# Patient Record
Sex: FEMALE | Race: WHITE | Employment: UNEMPLOYED | URBAN - METROPOLITAN AREA
[De-identification: names, ages, dates, MRNs, and addresses within clinical notes are randomized per-mention and may not be internally consistent; named-entity substitution may affect disease eponyms.]

---

## 2017-10-13 ENCOUNTER — HOSPITAL ENCOUNTER (INPATIENT)
Age: 37
LOS: 10 days | Discharge: HOME | DRG: 750 | End: 2017-10-23
Attending: PSYCHIATRY & NEUROLOGY | Admitting: PSYCHIATRY & NEUROLOGY
Payer: COMMERCIAL

## 2017-10-13 DIAGNOSIS — F20.0 PARANOID SCHIZOPHRENIA (HCC): Primary | ICD-10-CM

## 2017-10-13 LAB
ALBUMIN SERPL-MCNC: 4.2 G/DL (ref 3.9–4.9)
ALP BLD-CCNC: 64 U/L (ref 40–130)
ALT SERPL-CCNC: 13 U/L (ref 0–33)
AMPHETAMINE SCREEN, URINE: POSITIVE
ANION GAP SERPL CALCULATED.3IONS-SCNC: 14 MEQ/L (ref 7–13)
AST SERPL-CCNC: 17 U/L (ref 0–35)
BACTERIA: NORMAL /HPF
BARBITURATE SCREEN URINE: ABNORMAL
BASOPHILS ABSOLUTE: 0 K/UL (ref 0–0.2)
BASOPHILS RELATIVE PERCENT: 0.6 %
BENZODIAZEPINE SCREEN, URINE: POSITIVE
BILIRUB SERPL-MCNC: 0.6 MG/DL (ref 0–1.2)
BILIRUBIN URINE: ABNORMAL
BLOOD, URINE: NEGATIVE
BUN BLDV-MCNC: 5 MG/DL (ref 6–20)
CALCIUM SERPL-MCNC: 9.5 MG/DL (ref 8.6–10.2)
CANNABINOID SCREEN URINE: ABNORMAL
CASTS 2: NORMAL /LPF
CASTS UA: NORMAL /LPF
CHLORIDE BLD-SCNC: 102 MEQ/L (ref 98–107)
CLARITY: ABNORMAL
CO2: 26 MEQ/L (ref 22–29)
COCAINE METABOLITE SCREEN URINE: ABNORMAL
COLOR: ABNORMAL
CREAT SERPL-MCNC: 0.42 MG/DL (ref 0.5–0.9)
CRYSTALS, UA: NORMAL
EOSINOPHILS ABSOLUTE: 0 K/UL (ref 0–0.7)
EOSINOPHILS RELATIVE PERCENT: 0.2 %
EPITHELIAL CELLS, UA: NORMAL /HPF
ETHANOL PERCENT: NORMAL G/DL
ETHANOL: <10 MG/DL (ref 0–0.08)
GFR AFRICAN AMERICAN: >60
GFR NON-AFRICAN AMERICAN: >60
GLOBULIN: 2.4 G/DL (ref 2.3–3.5)
GLUCOSE BLD-MCNC: 128 MG/DL (ref 74–109)
GLUCOSE URINE: NEGATIVE MG/DL
HCG(URINE) PREGNANCY TEST: NEGATIVE
HCT VFR BLD CALC: 38.5 % (ref 37–47)
HEMOGLOBIN: 13 G/DL (ref 12–16)
KETONES, URINE: 40 MG/DL
LEUKOCYTE ESTERASE, URINE: ABNORMAL
LYMPHOCYTES ABSOLUTE: 1.5 K/UL (ref 1–4.8)
LYMPHOCYTES RELATIVE PERCENT: 27.4 %
Lab: ABNORMAL
MCH RBC QN AUTO: 30 PG (ref 27–31.3)
MCHC RBC AUTO-ENTMCNC: 33.7 % (ref 33–37)
MCV RBC AUTO: 88.9 FL (ref 82–100)
MONOCYTES ABSOLUTE: 0.6 K/UL (ref 0.2–0.8)
MONOCYTES RELATIVE PERCENT: 11.7 %
MUCUS: PRESENT
NEUTROPHILS ABSOLUTE: 3.3 K/UL (ref 1.4–6.5)
NEUTROPHILS RELATIVE PERCENT: 60.1 %
NITRITE, URINE: NEGATIVE
OPIATE SCREEN URINE: ABNORMAL
PDW BLD-RTO: 12.3 % (ref 11.5–14.5)
PH UA: 6 (ref 5–9)
PHENCYCLIDINE SCREEN URINE: ABNORMAL
PLATELET # BLD: 252 K/UL (ref 130–400)
POTASSIUM SERPL-SCNC: 3.4 MEQ/L (ref 3.5–5.1)
PROTEIN UA: 30 MG/DL
RBC # BLD: 4.33 M/UL (ref 4.2–5.4)
RBC UA: NORMAL /HPF (ref 0–2)
SODIUM BLD-SCNC: 142 MEQ/L (ref 132–144)
SPECIFIC GRAVITY UA: 1.02 (ref 1–1.03)
TOTAL CK: 54 U/L (ref 0–170)
TOTAL PROTEIN: 6.6 G/DL (ref 6.4–8.1)
TSH SERPL DL<=0.05 MIU/L-ACNC: 1.12 UIU/ML (ref 0.27–4.2)
UROBILINOGEN, URINE: 1 E.U./DL
WBC # BLD: 5.4 K/UL (ref 4.8–10.8)
WBC UA: NORMAL /HPF (ref 0–5)

## 2017-10-13 PROCEDURE — 99285 EMERGENCY DEPT VISIT HI MDM: CPT

## 2017-10-13 PROCEDURE — 81001 URINALYSIS AUTO W/SCOPE: CPT

## 2017-10-13 PROCEDURE — 6370000000 HC RX 637 (ALT 250 FOR IP): Performed by: PHYSICIAN ASSISTANT

## 2017-10-13 PROCEDURE — 1240000000 HC EMOTIONAL WELLNESS R&B

## 2017-10-13 PROCEDURE — 36415 COLL VENOUS BLD VENIPUNCTURE: CPT

## 2017-10-13 PROCEDURE — G0480 DRUG TEST DEF 1-7 CLASSES: HCPCS

## 2017-10-13 PROCEDURE — 80307 DRUG TEST PRSMV CHEM ANLYZR: CPT

## 2017-10-13 PROCEDURE — 6370000000 HC RX 637 (ALT 250 FOR IP): Performed by: PSYCHIATRY & NEUROLOGY

## 2017-10-13 PROCEDURE — 82550 ASSAY OF CK (CPK): CPT

## 2017-10-13 PROCEDURE — 80053 COMPREHEN METABOLIC PANEL: CPT

## 2017-10-13 PROCEDURE — 84703 CHORIONIC GONADOTROPIN ASSAY: CPT

## 2017-10-13 PROCEDURE — 84443 ASSAY THYROID STIM HORMONE: CPT

## 2017-10-13 PROCEDURE — 85025 COMPLETE CBC W/AUTO DIFF WBC: CPT

## 2017-10-13 RX ORDER — HALOPERIDOL 5 MG/ML
5 INJECTION INTRAMUSCULAR EVERY 6 HOURS PRN
Status: DISCONTINUED | OUTPATIENT
Start: 2017-10-13 | End: 2017-10-23 | Stop reason: HOSPADM

## 2017-10-13 RX ORDER — DEXTROAMPHETAMINE SACCHARATE, AMPHETAMINE ASPARTATE, DEXTROAMPHETAMINE SULFATE AND AMPHETAMINE SULFATE 7.5; 7.5; 7.5; 7.5 MG/1; MG/1; MG/1; MG/1
30 TABLET ORAL DAILY
Status: ON HOLD | COMMUNITY
End: 2017-10-23 | Stop reason: HOSPADM

## 2017-10-13 RX ORDER — HYDROXYZINE PAMOATE 50 MG/1
50 CAPSULE ORAL EVERY 6 HOURS PRN
Status: DISCONTINUED | OUTPATIENT
Start: 2017-10-13 | End: 2017-10-23 | Stop reason: HOSPADM

## 2017-10-13 RX ORDER — HYDROXYZINE HYDROCHLORIDE 50 MG/ML
50 INJECTION, SOLUTION INTRAMUSCULAR EVERY 6 HOURS PRN
Status: DISCONTINUED | OUTPATIENT
Start: 2017-10-13 | End: 2017-10-23 | Stop reason: HOSPADM

## 2017-10-13 RX ORDER — HALOPERIDOL 5 MG
5 TABLET ORAL EVERY 6 HOURS PRN
Status: DISCONTINUED | OUTPATIENT
Start: 2017-10-13 | End: 2017-10-23 | Stop reason: HOSPADM

## 2017-10-13 RX ORDER — PALIPERIDONE 6 MG/1
6 TABLET, EXTENDED RELEASE ORAL EVERY MORNING
Status: ON HOLD | COMMUNITY
End: 2017-10-23 | Stop reason: HOSPADM

## 2017-10-13 RX ORDER — ZOLPIDEM TARTRATE 10 MG/1
TABLET ORAL NIGHTLY PRN
Status: ON HOLD | COMMUNITY
End: 2017-10-23 | Stop reason: HOSPADM

## 2017-10-13 RX ORDER — ACETAMINOPHEN 325 MG/1
650 TABLET ORAL EVERY 4 HOURS PRN
Status: DISCONTINUED | OUTPATIENT
Start: 2017-10-13 | End: 2017-10-23 | Stop reason: HOSPADM

## 2017-10-13 RX ORDER — CLONAZEPAM 1 MG/1
1 TABLET ORAL 3 TIMES DAILY
Status: ON HOLD | COMMUNITY
End: 2017-10-23 | Stop reason: HOSPADM

## 2017-10-13 RX ORDER — HALOPERIDOL 5 MG
5 TABLET ORAL ONCE
Status: COMPLETED | OUTPATIENT
Start: 2017-10-13 | End: 2017-10-13

## 2017-10-13 RX ORDER — PALIPERIDONE 6 MG/1
6 TABLET, EXTENDED RELEASE ORAL EVERY MORNING
Status: DISCONTINUED | OUTPATIENT
Start: 2017-10-14 | End: 2017-10-16

## 2017-10-13 RX ORDER — TRAZODONE HYDROCHLORIDE 50 MG/1
50 TABLET ORAL NIGHTLY PRN
Status: DISCONTINUED | OUTPATIENT
Start: 2017-10-13 | End: 2017-10-17

## 2017-10-13 RX ADMIN — HYDROXYZINE PAMOATE 50 MG: 50 CAPSULE ORAL at 20:46

## 2017-10-13 RX ADMIN — HALOPERIDOL 5 MG: 5 TABLET ORAL at 20:46

## 2017-10-13 RX ADMIN — TRAZODONE HYDROCHLORIDE 50 MG: 50 TABLET ORAL at 20:46

## 2017-10-13 RX ADMIN — HALOPERIDOL 5 MG: 5 TABLET ORAL at 13:59

## 2017-10-13 ASSESSMENT — ENCOUNTER SYMPTOMS
CONSTIPATION: 0
COLOR CHANGE: 0
SORE THROAT: 0
SHORTNESS OF BREATH: 0
EYE DISCHARGE: 0
ABDOMINAL DISTENTION: 0
ABDOMINAL PAIN: 0
RHINORRHEA: 0

## 2017-10-13 ASSESSMENT — SLEEP AND FATIGUE QUESTIONNAIRES
DIFFICULTY FALLING ASLEEP: YES
SLEEP PATTERN: DIFFICULTY FALLING ASLEEP;INSOMNIA;RESTLESSNESS
DIFFICULTY STAYING ASLEEP: YES
RESTFUL SLEEP: NO
DIFFICULTY ARISING: NO
DO YOU HAVE DIFFICULTY SLEEPING: YES
AVERAGE NUMBER OF SLEEP HOURS: 0
DO YOU USE A SLEEP AID: NO

## 2017-10-13 ASSESSMENT — PATIENT HEALTH QUESTIONNAIRE - PHQ9: SUM OF ALL RESPONSES TO PHQ QUESTIONS 1-9: 20

## 2017-10-13 NOTE — ED NOTES
Talked with Jose Erickson on [facility] who states she will have Claire Daniels call the ER back when she returns to the nursing desk area to give [employer] a report and get a bed.      Gadiel Roth RN  10/13/17 1217

## 2017-10-13 NOTE — ED NOTES
Pt and her boyfriend are aware of awaiting labs and will call the on call psychiatrist for pt's disposition.   Explained procedure and 3 possible disposition: D/C home, hospitalize, and or [facility]'s unit but have to be voluntary to go to [facility]      Slick Montague, RN  10/13/17 0127

## 2017-10-13 NOTE — ED NOTES
Lab here to draw her blood she is having trouble giving her name and date of birth to lab staff - it takes her awhile to respond and answer questions.   She is cooperative with the lab staff     Isadora Jerry RN  10/13/17 1677

## 2017-10-13 NOTE — ED NOTES
Explained labs and will be consulting with the doctor regarding labs for a UTI manuela Torres RN  10/13/17 6211

## 2017-10-13 NOTE — ED NOTES
PA and Dr. Nancy Harkins are busy with medical pt's currently will wait until the PA is back to ask regarding a UTI     Jessica Garay RN  10/13/17 0375

## 2017-10-13 NOTE — ED NOTES
Pt eating breakfast at the bedside, she is quiet and cooperative with no problems or C/O any kind noted or expressed.   She is at bedside, quiet and cooperative with even respirations noted     Tequila Wiggins RN  10/13/17 9668

## 2017-10-13 NOTE — ED NOTES
Pt is in the bedroom area with her boyfriend she is currently quiet and cooperative with no problems or C/O any kind noted or expressed.   No respiratory distress or SOB noted  Will call her pharmacy for her medications as pt is not aware of what she is taking Awaiting labs back and will call the on call doctor for her disposition     Florin Cordova RN  10/13/17 7666

## 2017-10-13 NOTE — ED NOTES
Pt came in with a friend from home as a walk in     [facility], [address] [place] [address]  10/13/17 7217

## 2017-10-13 NOTE — ED NOTES
Called security for pt's belongings sent bed request to the [facility] for room [address] [place] [address]  10/13/17 6208

## 2017-10-13 NOTE — ED NOTES
Pt is aware of waiting for her labs to come back before calling the on call doctor     Meir Singletary RN  10/13/17 1362

## 2017-10-13 NOTE — ED NOTES
Awaiting pt's microscopic from lab to return to see id she needs an ATB for a UTI awaiting on lab. Pt in bed area a little less fearful and has tried several times to exit the ced but accepts redirection from the door and has not attempted in last 10 minutes, she has even respirations noted no problems or C/O any kind noted or expressed. She will walk to the door and then return back to her bed area without pushing on the door.      Florin Cordova RN  10/13/17 3229

## 2017-10-13 NOTE — ED PROVIDER NOTES
[facility]  eMERGENCY dEPARTMENT eNCOUnter      Pt Name: Pérez Xiao  MRN: [id number]  Eliecergfperez 1980  Date of evaluation: 10/13/2017  Provider: Nik Day PA-C    CHIEF COMPLAINT       Chief Complaint   Patient presents with   [address] Problem      brings in for \"psychotic break\" symptoms have been getting progressively worse for about 1 week known \"schizophrenia\"         HISTORY OF PRESENT ILLNESS   (Location/Symptom, Timing/Onset, Context/Setting, Quality, Duration, Modifying Factors, Severity)  Note limiting factors. Pérez Xiao is a 40 y.o. female who presents to the emergency department With a complaint of increasing hallucinations and worsening paranoid schizophrenia.  states that over the last week or so patient has gotten to point to where she cannot carry on a conversation with him she is progressively became more paranoid and aggressive in her behavior, he states this is happened once previously in the past when her schizophrenia became worse. He states she has a private psychiatrist that she sees as far as he knows she's been taking her medications on a regular basis she's had no acute illness or injury. HPI    Nursing Notes were reviewed. REVIEW OF SYSTEMS    (2-9 systems for level 4, 10 or more for level 5)     Review of Systems   Constitutional: Negative for activity change, appetite change, fatigue and fever. HENT: Negative for congestion, ear discharge, ear pain, nosebleeds, rhinorrhea and sore throat. Eyes: Negative for discharge. Respiratory: Negative for shortness of breath. Cardiovascular: Negative for chest pain, palpitations and leg swelling. Gastrointestinal: Negative for abdominal distention, abdominal pain and constipation. Genitourinary: Negative for difficulty urinating and dysuria. Musculoskeletal: Negative for arthralgias. Skin: Negative for color change.    Neurological: Negative for dizziness, syncope, numbness and headaches. Psychiatric/Behavioral: Positive for confusion and hallucinations. Negative for agitation, self-injury, sleep disturbance and suicidal ideas. The patient is nervous/anxious. The patient is not hyperactive. Except as noted above the remainder of the review of systems was reviewed and negative. PAST MEDICAL HISTORY     Past Medical History:   Diagnosis Date    Depression     Headache          SURGICAL HISTORY       Past Surgical History:   Procedure Laterality Date     SECTION Bilateral          CURRENT MEDICATIONS       Current Discharge Medication List      CONTINUE these medications which have NOT CHANGED    Details   clonazePAM (KLONOPIN) 1 MG tablet Take 1 mg by mouth three times daily      paliperidone (INVEGA) 6 MG extended release tablet Take 6 mg by mouth every morning      zolpidem (AMBIEN) 10 MG tablet Take by mouth nightly as needed for Sleep      amphetamine-dextroamphetamine (ADDERALL) 30 MG tablet Take 30 mg by mouth daily . ALLERGIES     Geodon [ziprasidone hcl]; Imitrex [sumatriptan]; Nickel; and Zyprexa [olanzapine]    FAMILY HISTORY     No family history on file.        SOCIAL HISTORY       Social History     Social History    Marital status: Single     Spouse name: N/A    Number of children: N/A    Years of education: N/A     Social History Main Topics    Smoking status: Former Smoker    Smokeless tobacco: Not on file    Alcohol use No    Drug use: No    Sexual activity: Yes     Other Topics Concern    Not on file     Social History Narrative    No narrative on file       SCREENINGS   NIH Stroke Scale  NIH Stroke Scale Assessed: NoGlasgow Coma Scale  Eye Opening: Spontaneous  Best Verbal Response: Confused  Best Motor Response: Obeys commands  Toñito Coma Scale Score: 14        PHYSICAL EXAM    (up to 7 for level 4, 8 or more for level 5)     ED Triage Vitals [10/13/17 1022]   BP Temp Temp Source Pulse Resp SpO2 Height Weight   129/85 99 °F (37.2 °C) Oral 113 16 96 % 5' 7\" (1.702 m) 120 lb (54.4 kg)       Physical Exam   Constitutional: She appears well-developed and well-nourished. HENT:   Head: Normocephalic. Eyes: Pupils are equal, round, and reactive to light. Neck: Normal range of motion. Neck supple. No JVD present. No tracheal deviation present. Cardiovascular: Normal rate. Pulmonary/Chest: Effort normal. No respiratory distress. She has no wheezes. She has no rales. She exhibits no tenderness. Abdominal: Soft. Bowel sounds are normal. She exhibits no distension and no mass. There is no tenderness. There is no guarding. Musculoskeletal: She exhibits no edema or deformity. Neurological: She is alert. Coordination normal.   Patient is alert but she does seem confusion cannot answer simple questions she believes that everyone is talking about her, she is very paranoid in her behavior.  states she's not been sleeping over the last 48 hours. Skin: Skin is warm and dry. Psychiatric:   Paranoid behavior increasing auditory hallucinations per  she voices all the time. Her voice is becoming louder and more disruptive to her cognitive thoughts.        DIAGNOSTIC RESULTS     EKG: All EKG's are interpreted by the Emergency Department Physician who either signs or Co-signs this chart in the absence of a cardiologist.        RADIOLOGY:   Non-plain film images such as CT, Ultrasound and MRI are read by the radiologist. Plain radiographic images are visualized and preliminarily interpreted by the emergency physician with the below findings:        Interpretation per the Radiologist below, if available at the time of this note:    No orders to display         ED BEDSIDE ULTRASOUND:   Performed by ED Physician - none    LABS:  Labs Reviewed   COMPREHENSIVE METABOLIC PANEL - Abnormal; Notable for the following:        Result Value    Potassium 3.4 (*)     Anion Gap 14 (*)     Glucose 128 (*)     BUN 5 (*)     CREATININE 0.42 dictations but occasionally words are mis-transcribed.)    Callie Larry PA-C (electronically signed)  Attending Emergency Physician         Callie Larry PA-C  10/13/17 [address] [facility] Brianna Henry PA-C  10/13/17 8410

## 2017-10-13 NOTE — ED NOTES
Pt asked for medication to help her Klonopin or Haldol discussed with Hannah Leiva in zone I who ordered Haldol for the pt     Gadiel Roth RN  10/13/17 1875

## 2017-10-13 NOTE — PROGRESS NOTES
Pt experiencing auditory hallucinations AEB her coming to NS stating \"I can hear my baby crying, the door is locked and I can't get to her. Patient is wandering unit, difficult to re-direct.

## 2017-10-14 PROCEDURE — 1240000000 HC EMOTIONAL WELLNESS R&B

## 2017-10-14 PROCEDURE — 6370000000 HC RX 637 (ALT 250 FOR IP): Performed by: PSYCHIATRY & NEUROLOGY

## 2017-10-14 PROCEDURE — 6370000000 HC RX 637 (ALT 250 FOR IP): Performed by: NURSE PRACTITIONER

## 2017-10-14 RX ORDER — POTASSIUM CHLORIDE 20 MEQ/1
20 TABLET, EXTENDED RELEASE ORAL ONCE
Status: COMPLETED | OUTPATIENT
Start: 2017-10-14 | End: 2017-10-14

## 2017-10-14 RX ADMIN — POTASSIUM CHLORIDE 20 MEQ: 20 TABLET, EXTENDED RELEASE ORAL at 11:19

## 2017-10-14 RX ADMIN — PALIPERIDONE 6 MG: 6 TABLET, EXTENDED RELEASE ORAL at 09:20

## 2017-10-14 RX ADMIN — HALOPERIDOL 5 MG: 5 TABLET ORAL at 09:23

## 2017-10-14 ASSESSMENT — LIFESTYLE VARIABLES: HISTORY_ALCOHOL_USE: NO

## 2017-10-14 NOTE — PROGRESS NOTES
Medicated per lpn with haldol 5mg po,vistaril 50mgpo and trazadone 50mg po for anxiety, sleep, hearing babies cry

## 2017-10-14 NOTE — CARE COORDINATION
10/14/17 2 1227 -  requested of patient to disclose a collateral informant. Patient refused to provide this information.     Isabell RUSH

## 2017-10-14 NOTE — PLAN OF CARE
Problem: Altered Mood, Psychotic Behavior  Goal: STG-Able to demonstrate trust by eating, participating in treatment and following staff's directions  Outcome: Met This Shift    Goal: LTG-Able to verbalize decrease in frequency and intensity of hallucinations  Outcome: Ongoing    Goal: LTG-Able to verbalize reality based thinking  Outcome: Ongoing    Goal: LTG-Absence of self-harm  Outcome: Met This Shift    Goal: STG-Adequate nutritional intake  Outcome: Met This Shift    Goal: LTG-Appropriate social interaction  Outcome: Ongoing    Goal: STG-Medication therapy compliance  Outcome: Ongoing

## 2017-10-14 NOTE — CARE COORDINATION
Brief Intervention and Referral to Treatment Summary    Patient was provided PHQ-9, AUDIT and DAST Screening:      PHQ-9 Score:  20  AUDIT Score: 0  DAST Score:  0    Patients substance use is considered    Low Risk/Healthy x  Risk  Harmful  Dependent    Patients depression is considered:    Minimal  Mild   Moderate  Moderately Severe  Severe x    Brief Education was provided:N/A    Patient was receptive  Patient was not receptive      Brief Intervention Is Provided (Only for AUDIT or DAST, there is no brief intervention for Depression)N/A    Patient reports readiness to decrease and/or stop use and a plan was discussed   Patient denies readiness to decrease and/or stop use and a plan was not discussed      Recommendations/Referrals for Brief and/or Specialized Treatment Provided to Patient  Patient denied any past or present drug/alcohol issues. As a result, patient was not provided any brief education or intervention.     Miles RUSH

## 2017-10-14 NOTE — PROGRESS NOTES
10/14/17 @1428 - Patient was approached regarding Leisure Assessment. When asked about how she spends free time, patient said she has no free time because she is taking care of her infant. 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 Patient was forthcoming about her mental health challenges. She said she was thinking of changing doctors to get the help she needs. Patient perceives her medication as \"not working long enough\". When asked to clarify, patient had difficulty due to thought blocking.      Dennise RUSH

## 2017-10-14 NOTE — CARE COORDINATION
BHI Biopsychosocial Assessment    Current Level of Psychosocial Functioning     Independent   Dependent    Minimal Assist x    Comments: Patient lives with her fijosee and her infant. She receives social security benefits and [employer]. Psychosocial High Risk Factors (check all that apply)    Unable to obtain meds   Chronic illness/pain    Substance abuse   Lack of Family Support   Financial stress   Isolation x  Inadequate Community Resources x  Suicide attempt(s)  Not taking medications   Victim of crime   Developmental Delay  Unable to manage personal needs    Age 72 or older   Homeless  No transportation   Readmission within 30 days  Unemployment  Traumatic Event    Comments: Patient reported she has problems with her current mental health provider. She said there is a problem with access because her doctor is located a greater distance than she is willing to travel. Psychiatric Advanced Directives: None Reported. Family to Involve in Treatment: Patient refused to provide collateral contact information. Sexual Orientation:  Patient is currently in a heterosexual relationship. Patient Strengths: Patient is cooperative and engaging. Patient Barriers: None Identified. Opiate Education Provided:  Not necessary. Per patient, she is not drug abusing. CMHC/mental health history: Patient has been hospitalized on the [facility] unit several times in the past.    Plan of Care   medication management, group/individual therapies, family meetings, psycho -education, treatment team meetings to assist with stabilization    Initial Discharge Plan:  Patient is considering switching mental health providers. She is not sure to whom or where. Patient will return to her home and resume caring for her infant. Clinical Summary:    Patient is a 40year old female who presents with severe depression. Patient has been on the [facility] unit several times prior to this admission.  She believes her community

## 2017-10-14 NOTE — PROGRESS NOTES
PSYCHIATRIC EVALUATION      CHIEF COMPLAINT:  psychosis    HISTORY OF PRESENT ILLNESS: Aixa Russell  is a 40 y.o. female with history of treatment for Schizophrenia who was admitted from the ER due to decompensation of psychosis. Per Psychiatric notes, the patient was brought to the ER by her  for increased hallucinations, and paranoia and aggression. When interviewed today, the patient said she 'didn't take her meds for 2 days\", and 'was OK, and the baby was . .all I wanted to do was sleep\"> She said she had been \"very tired\", because she had been watching, and was afraid of what could happen to her\". She said she was afraid that her baby could be kidnapped, and \"now she feels that her kid is kidnapped\". When asked why she thought that, she said she had heard her baby in the hallway, and thought she had seen her here yesterday. She said that her  had \"all of a sudden told her to go to the hospital\". She said that at home, she does not sleep because she is watching the baby, \"the whole time\", \"since she was born\". She said that otherwise her appetite was \"OK\". She did say she felt depressed, because she was here, and wanted to go home. She denied having any suicidal or homicidal ideations. She denied having auditory or visual hallucinations. She acknowledged feeling \"a little bit\" paranoid; however she said she doesn't know who would want to harm her. She also acknowledged feeling that the TV was talking about her. PAST PSYCHIATRIC HISTORY: Schizophrenia. She had previous admissions to Psychiatry. She had a suicide attempt in 01/2013. PAST MEDICAL HISTORY:       Diagnosis Date    Depression     Headache    hypotension  Back pain    ROS: negative, except for back pain and dizziness    ALLERGIES: Geodon [ziprasidone hcl]; Imitrex [sumatriptan];  Nickel; and Zyprexa [olanzapine]    FAMILY PSYCHIATRIC HISTORY: the patient said her mother has depression, and her grandmother had committed suicide    SOCIAL HISTORY: the patient said she was born and raised [place]. She has a college education, and has a Bachelor's degree in Psychology. 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 She said she is not , and that her \"\" is actually her fiance. She has a one year old daughter. She lives with the fiance and her daughter. Legal Hx: denied    SUBSTANCE ABUSE HISTORY: the patient denied smoking cigarettes. She drinks alcohol, \"occasionally\". She denied using drugs. She denied having been in chemical dependency treatment. VITALS: BP 94/68   Pulse 110   Temp 98 °F (36.7 °C) (Oral)   Resp 20   Ht 5' 7\" (1.702 m)   Wt 115 lb (52.2 kg)   LMP  (LMP Unknown)   SpO2 99%   Breastfeeding?  No   BMI 18.01 kg/m²     MENTAL STATUS EXAM: Appearance: alert, fair grooming, in hospital gown Behavior: guarded Mood: depressed, anxious Affect: blunted Speech: with low tone and volume Thought Process: with concrete associations Thought Content: denies suicidal or homicidal ideations; she has paranoia and reference ideations Perception: auditory and visual hallucinations, as above Orientation: oriented to time, place, self Concentration: fair Memory: fair Abstraction: concrete associations Fund of knowledge: fair Insight: limited Judgement: limited    LABS:   Admission on 10/13/2017   Component Date Value Ref Range Status    WBC 10/13/2017 5.4  4.8 - 10.8 K/uL Final    RBC 10/13/2017 4.33  4.20 - 5.40 M/uL Final    Hemoglobin 10/13/2017 13.0  12.0 - 16.0 g/dL Final    Hematocrit 10/13/2017 38.5  37.0 - 47.0 % Final    MCV 10/13/2017 88.9  82.0 - 100.0 fL Final    MCH 10/13/2017 30.0  27.0 - 31.3 pg Final    MCHC 10/13/2017 33.7  33.0 - 37.0 % Final    RDW 10/13/2017 12.3  11.5 - 14.5 % Final    Platelets 27/55/1420 252  130 - 400 K/uL Final    Neutrophils % 10/13/2017 60.1  % Final    Lymphocytes % 10/13/2017 27.4  % Final    Monocytes % 10/13/2017 11.7  % Final    Glucose, Ur 10/13/2017 Negative  Negative mg/dL Final    Bilirubin Urine 10/13/2017 MODERATE* Negative Final    Ketones, Urine 10/13/2017 40* Negative mg/dL Final    Specific Gravity, UA 10/13/2017 1.022  1.005 - 1.030 Final    Blood, Urine 10/13/2017 Negative  Negative Final    pH, UA 10/13/2017 6.0  5.0 - 9.0 Final    Protein, UA 10/13/2017 30* Negative mg/dL Final    Urobilinogen, Urine 10/13/2017 1.0  <2.0 E.U./dL Final    Nitrite, Urine 10/13/2017 Negative  Negative Final    Leukocyte Esterase, Urine 10/13/2017 SMALL* Negative Final    Amphetamine Screen, Urine 10/13/2017 POSITIVE* Negative <1000 ng/mL Final    Comment: High concentrations of ephedrine/pseudoephedrine or  phenylpropanolamine may cause false positive results  for amphetamine. Therefore, confirmatory testing for  amphetamine should be considered if clinically indicated.  Barbiturate Screen, Ur 10/13/2017 Neg  Negative < 200 ng/mL Final    Benzodiazepine Screen, Urine 10/13/2017 POSITIVE* Negative < 200 ng/mL Final    Cannabinoid Scrn, Ur 10/13/2017 Neg  Negative < 50 ng/mL Final    COCAINE METABOLITE SCREEN URINE 10/13/2017 Neg  Negative < 300 ng/mL Final    Opiate Scrn, Ur 10/13/2017 Neg  Negative < 300 ng/mL Final    PCP Scrn, Ur 10/13/2017 Neg  Negative < 25 ng/mL Final    Drug Screen Comment: 10/13/2017 see below   Final    Comment: This method is a screening test to detect only these drug  classes as part of a medical workup. Confirmatory testing  by another method should be ordered if clinically indicated.  CASTS 2 10/13/2017 0-1 Coarse Yimi Amass  /LPF Final    Casts UA 10/13/2017 0-1 Fine Yimi Amass. /LPF Final    Mucus, UA 10/13/2017 Present   Final    WBC, UA 10/13/2017 3-5  0 - 5 /HPF Final    RBC, UA 10/13/2017 0-2  0 - 2 /HPF Final    Epi Cells 10/13/2017 3-5  /HPF Final    Bacteria, UA 10/13/2017 Few  /HPF Final    Crystals 10/13/2017 Few Ca.  Oxalate   Final           MEDICATIONS: Current Facility-Administered Medications: paliperidone (INVEGA) extended release tablet 6 mg, 6 mg, Oral, QAM  acetaminophen (TYLENOL) tablet 650 mg, 650 mg, Oral, Q4H PRN  magnesium hydroxide (MILK OF MAGNESIA) 400 MG/5ML suspension 30 mL, 30 mL, Oral, Daily PRN  haloperidol (HALDOL) tablet 5 mg, 5 mg, Oral, Q6H PRN **OR** haloperidol lactate (HALDOL) injection 5 mg, 5 mg, Intramuscular, Q6H PRN  hydrOXYzine (VISTARIL) capsule 50 mg, 50 mg, Oral, Q6H PRN **OR** hydrOXYzine (VISTARIL) injection 50 mg, 50 mg, Intramuscular, Q6H PRN  traZODone (DESYREL) tablet 50 mg, 50 mg, Oral, Nightly PRN     ASSESSMENT:     Schizophrenia    PLAN: Patient admitted to [facility] general program for further evaluation, treatment and safety. Daily vitals, regular diet provided. Patient will be started on Invega and trazodone for Schizophrenia. PRN medications for agitation, anxiety and sleep are in place. Patient will be encouraged to participate in individual, group and milieu therapy. Patient will be discharged when clinically stable. Please note that case has been discussed with treatment team and notes have been reviewed.        Signed:  Eri Leroy  10/14/2017  6:50 PM

## 2017-10-14 NOTE — H&P
[facility] MEDICINE    HISTORY AND PHYSICAL EXAM    PATIENT NAME:  Timothy Ocampo    MRN:  [id number]  SERVICE DATE:  10/14/2017   SERVICE TIME:  10:01 AM    Primary Care Physician: No primary care provider on file. SUBJECTIVE  CHIEF COMPLAINT:  Medical clear for inpatient psychiatry admission. Consult for medical H/P encounter. HPI:  This is a 40 y.o. female who presents to [facility] ED with c/o increasing hallucinations and worsening paranoid schizophrenia. She is admitted to behavioral health unit with schizophrenia paranoid type. She denies any SOB, CP, N/V, constipation or diarrhea. PAST MEDICAL HISTORY:    Past Medical History:   Diagnosis Date    Depression     Headache      PAST SURGICAL HISTORY:    Past Surgical History:   Procedure Laterality Date     SECTION Bilateral      FAMILY HISTORY:  No family history on file. SOCIAL HISTORY:    Social History     Social History    Marital status: Single     Spouse name: N/A    Number of children: N/A    Years of education: N/A     Occupational History    Not on file.      Social History Main Topics    Smoking status: Former Smoker    Smokeless tobacco: Not on file    Alcohol use No    Drug use: No    Sexual activity: Yes     Other Topics Concern    Not on file     Social History Narrative    No narrative on file     MEDICATIONS:    Current Facility-Administered Medications   Medication Dose Route Frequency Provider Last Rate Last Dose    potassium chloride (KLOR-CON M) extended release tablet 20 mEq  20 mEq Oral Once Tahira Else, APRMURIEL        paliperidone (INVEGA) extended release tablet 6 mg  6 mg Oral QAM Ingrid Chun MD   6 mg at 10/14/17 0920    acetaminophen (TYLENOL) tablet 650 mg  650 mg Oral Q4H PRN Ingrid Chun MD        magnesium hydroxide (MILK OF MAGNESIA) 400 MG/5ML suspension 30 mL  30 mL Oral Daily PRN Ingrid Chun MD        haloperidol (HALDOL) tablet 5 mg  5 mg Oral Q6H PRN Paige Jane Erwin Castro MD   5 mg at 10/14/17 3075    Or    haloperidol lactate (HALDOL) injection 5 mg  5 mg Intramuscular Q6H PRN Rona Nicole MD        hydrOXYzine (VISTARIL) capsule 50 mg  50 mg Oral Q6H PRN Rona Nicole MD   50 mg at 10/13/17 2046    Or    hydrOXYzine (VISTARIL) injection 50 mg  50 mg Intramuscular Q6H PRN Rona Nciole MD        traZODone (DESYREL) tablet 50 mg  50 mg Oral Nightly PRN Rona Nicole MD   50 mg at 10/13/17 2046       ALLERGIES: Geodon [ziprasidone hcl]; Imitrex [sumatriptan]; Nickel; and Zyprexa [olanzapine]    REVIEW OF SYSTEM:   ROS as noted in HPI, 12 point ROS reviewed and otherwise negative. OBJECTIVE  PHYSICAL EXAM: BP (!) 64/31   Pulse 66   Temp 98 °F (36.7 °C) (Oral)   Resp 20   Ht 5' 7\" (1.702 m)   Wt 115 lb (52.2 kg)   LMP  (LMP Unknown)   SpO2 99%   Breastfeeding? No   BMI 18.01 kg/m²  repeat b/p 94/68  CONSTITUTIONAL:  awake, alert, cooperative, no apparent distress, and appears stated age  EYES:  Lids and lashes normal, pupils equal, round and reactive to light, extra ocular muscles intact, sclera clear, conjunctiva normal  ENT:  Normocephalic, without obvious abnormality, atraumatic, sinuses nontender on palpation, external ears without lesions, oral pharynx with moist mucus membranes, tonsils without erythema or exudates, gums normal and good dentition. NECK:  Supple, symmetrical, trachea midline, no adenopathy, thyroid symmetric, not enlarged and no tenderness, skin normal  LUNGS:  No increased work of breathing, good air exchange, clear to auscultation bilaterally, no crackles or wheezing  CARDIOVASCULAR:  Normal apical impulse, regular rate and rhythm, normal S1 and S2, no S3 or S4, and no murmur noted  ABDOMEN:  No scars, normal bowel sounds, soft, non-distended, non-tender, no masses palpated, no hepatosplenomegally  MUSCULOSKELETAL:  There is no redness, warmth, or swelling of the joints. Full range of motion noted.   Motor strength is

## 2017-10-15 LAB — POTASSIUM SERPL-SCNC: 3.9 MEQ/L (ref 3.5–5.1)

## 2017-10-15 PROCEDURE — 36415 COLL VENOUS BLD VENIPUNCTURE: CPT

## 2017-10-15 PROCEDURE — 84132 ASSAY OF SERUM POTASSIUM: CPT

## 2017-10-15 PROCEDURE — 1240000000 HC EMOTIONAL WELLNESS R&B

## 2017-10-15 PROCEDURE — 6370000000 HC RX 637 (ALT 250 FOR IP): Performed by: PSYCHIATRY & NEUROLOGY

## 2017-10-15 RX ADMIN — MAGNESIUM HYDROXIDE 30 ML: 400 SUSPENSION ORAL at 11:19

## 2017-10-15 RX ADMIN — TRAZODONE HYDROCHLORIDE 50 MG: 50 TABLET ORAL at 21:24

## 2017-10-15 RX ADMIN — HYDROXYZINE PAMOATE 50 MG: 50 CAPSULE ORAL at 11:19

## 2017-10-15 RX ADMIN — PALIPERIDONE 6 MG: 6 TABLET, EXTENDED RELEASE ORAL at 10:11

## 2017-10-15 NOTE — PROGRESS NOTES
Pt noted visiting with male visitor, asked what pt likes to eat and visitor stated she is a hard person to feed, talked with pt about not eating enough. Encouraged fluids.

## 2017-10-15 NOTE — PLAN OF CARE
Problem: Altered Mood, Psychotic Behavior  Goal: STG-Able to demonstrate trust by eating, participating in treatment and following staff's directions  Outcome: Ongoing    Goal: LTG-Able to verbalize decrease in frequency and intensity of hallucinations  Outcome: Ongoing    Goal: LTG-Able to verbalize reality based thinking  Outcome: Ongoing    Goal: LTG-Absence of self-harm  Outcome: Met This Shift    Goal: STG-Adequate nutritional intake  Outcome: Not Met This Shift    Goal: LTG-Appropriate social interaction  Outcome: Not Met This Shift    Goal: STG-Medication therapy compliance  Outcome: Met This Shift

## 2017-10-15 NOTE — PROGRESS NOTES
Plan:  [x] Continue Current Medications  [x] Continue Follow-up  [x] Continue Labs      [x] Risks, benefits, side effects, drug-to-drug interactions and alternatives to treatment were discussed in my usual manner.     Reason for more than one antipsychotic:  [x] N/A  [] 3 failed monotherapy(drugs tried):  [] Cross over to a new antipsychotic  [] Taper to monotherapy from polypharmacy  [] Augmentation of Clozapine therapy due to treatment resistance to single therapy      Electronically signed by Sami Kerns MD on 10/15/2017 at 7:17 PM

## 2017-10-16 PROBLEM — F20.0 PARANOID SCHIZOPHRENIA (HCC): Status: ACTIVE | Noted: 2017-10-16

## 2017-10-16 PROCEDURE — 6370000000 HC RX 637 (ALT 250 FOR IP): Performed by: PSYCHIATRY & NEUROLOGY

## 2017-10-16 PROCEDURE — 1240000000 HC EMOTIONAL WELLNESS R&B

## 2017-10-16 PROCEDURE — 99233 SBSQ HOSP IP/OBS HIGH 50: CPT | Performed by: PSYCHIATRY & NEUROLOGY

## 2017-10-16 RX ORDER — FLUPHENAZINE HYDROCHLORIDE 5 MG/1
5 TABLET ORAL NIGHTLY
Status: DISCONTINUED | OUTPATIENT
Start: 2017-10-16 | End: 2017-10-18

## 2017-10-16 RX ADMIN — FLUPHENAZINE HYDROCHLORIDE 5 MG: 5 TABLET, FILM COATED ORAL at 20:54

## 2017-10-16 RX ADMIN — PALIPERIDONE 6 MG: 6 TABLET, EXTENDED RELEASE ORAL at 09:23

## 2017-10-16 NOTE — H&P
Diagnosis Date    Depression      Headache     hypotension  Back pain     ROS: negative, except for back pain and dizziness     ALLERGIES: Geodon [ziprasidone hcl]; Imitrex [sumatriptan]; Nickel; and Zyprexa [olanzapine]     FAMILY PSYCHIATRIC HISTORY: the patient said her mother has depression, and her grandmother had committed suicide     SOCIAL HISTORY: the patient said she was born and raised in [facility] [place] [employer]. She has a college education, and has a Bachelor's degree in Psychology. 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 She said she is not , and that her \"\" is actually her fiance. She has a one year old daughter. She lives with the fiance and her daughter.      Legal Hx: denied     SUBSTANCE ABUSE HISTORY: the patient denied smoking cigarettes. She drinks alcohol, \"occasionally\". She denied using drugs. She denied having been in chemical dependency treatment.      VITALS: BP 94/68   Pulse 110   Temp 98 °F (36.7 °C) (Oral)   Resp 20   Ht 5' 7\" (1.702 m)   Wt 115 lb (52.2 kg)   LMP  (LMP Unknown)   SpO2 99%   Breastfeeding?  No   BMI 18.01 kg/m²      MENTAL STATUS EXAM: Appearance: alert, fair grooming, in hospital gown Behavior: guarded Mood: depressed, anxious Affect: blunted Speech: with low tone and volume Thought Process: with concrete associations Thought Content: denies suicidal or homicidal ideations; she has paranoia and reference ideations Perception: auditory and visual hallucinations, as above Orientation: oriented to time, place, self Concentration: fair Memory: fair Abstraction: concrete associations Fund of knowledge: fair Insight: limited Judgement: limited     LABS:           Admission on 10/13/2017   Component Date Value Ref Range Status    WBC 10/13/2017 5.4  4.8 - 10.8 K/uL Final    RBC 10/13/2017 4.33  4.20 - 5.40 M/uL Final    Hemoglobin 10/13/2017 13.0  12.0 - 16.0 g/dL Final    Hematocrit 10/13/2017 38.5  37.0 - 47.0 % Final    MCV 10/13/2017 Ethanol Lvl 10/13/2017 <10  mg/dL Final    Ethanol percent 10/13/2017 Not indicated  G/dL Final    HCG(Urine) Pregnancy Test 10/13/2017 Negative  Detects HCG level >20 MIU/mL Final    TSH 10/13/2017 1.120  0.270 - 4.200 uIU/mL Final    Color, UA 10/13/2017 JAYME* Straw/Yellow Final    Clarity, UA 10/13/2017 CLOUDY* Clear Final    Glucose, Ur 10/13/2017 Negative  Negative mg/dL Final    Bilirubin Urine 10/13/2017 MODERATE* Negative Final    Ketones, Urine 10/13/2017 40* Negative mg/dL Final    Specific Gravity, UA 10/13/2017 1.022  1.005 - 1.030 Final    Blood, Urine 10/13/2017 Negative  Negative Final    pH, UA 10/13/2017 6.0  5.0 - 9.0 Final    Protein, UA 10/13/2017 30* Negative mg/dL Final    Urobilinogen, Urine 10/13/2017 1.0  <2.0 E.U./dL Final    Nitrite, Urine 10/13/2017 Negative  Negative Final    Leukocyte Esterase, Urine 10/13/2017 SMALL* Negative Final    Amphetamine Screen, Urine 10/13/2017 POSITIVE* Negative <1000 ng/mL Final     Comment: High concentrations of ephedrine/pseudoephedrine or  phenylpropanolamine may cause false positive results  for amphetamine. Therefore, confirmatory testing for  amphetamine should be considered if clinically indicated.  Barbiturate Screen, Ur 10/13/2017 Neg  Negative < 200 ng/mL Final    Benzodiazepine Screen, Urine 10/13/2017 POSITIVE* Negative < 200 ng/mL Final    Cannabinoid Scrn, Ur 10/13/2017 Neg  Negative < 50 ng/mL Final    COCAINE METABOLITE SCREEN URINE 10/13/2017 Neg  Negative < 300 ng/mL Final    Opiate Scrn, Ur 10/13/2017 Neg  Negative < 300 ng/mL Final    PCP Scrn, Ur 10/13/2017 Neg  Negative < 25 ng/mL Final    Drug Screen Comment: 10/13/2017 see below    Final     Comment: This method is a screening test to detect only these drug  classes as part of a medical workup. Confirmatory testing  by another method should be ordered if clinically indicated.     CASTS 2 10/13/2017 0-1 Coarse Donel Graft  /LPF Final    Casts UA 10/13/2017 0-1 Haritha Fierro. /LPF Final    Mucus, UA 10/13/2017 Present    Final    WBC, UA 10/13/2017 3-5  0 - 5 /HPF Final    RBC, UA 10/13/2017 0-2  0 - 2 /HPF Final    Epi Cells 10/13/2017 3-5  /HPF Final    Bacteria, UA 10/13/2017 Few  /HPF Final    Crystals 10/13/2017 Few Ca. Oxalate    Final            MEDICATIONS: Current Facility-Administered Medications: paliperidone (INVEGA) extended release tablet 6 mg, 6 mg, Oral, QAM  acetaminophen (TYLENOL) tablet 650 mg, 650 mg, Oral, Q4H PRN  magnesium hydroxide (MILK OF MAGNESIA) 400 MG/5ML suspension 30 mL, 30 mL, Oral, Daily PRN  haloperidol (HALDOL) tablet 5 mg, 5 mg, Oral, Q6H PRN **OR** haloperidol lactate (HALDOL) injection 5 mg, 5 mg, Intramuscular, Q6H PRN  hydrOXYzine (VISTARIL) capsule 50 mg, 50 mg, Oral, Q6H PRN **OR** hydrOXYzine (VISTARIL) injection 50 mg, 50 mg, Intramuscular, Q6H PRN  traZODone (DESYREL) tablet 50 mg, 50 mg, Oral, Nightly PRN     ASSESSMENT:      Schizophrenia     PLAN: Patient admitted to 3W general program for further evaluation, treatment and safety. Daily vitals, regular diet provided. Patient will be started on Invega and trazodone for Schizophrenia. PRN medications for agitation, anxiety and sleep are in place. Patient will be encouraged to participate in individual, group and milieu therapy. Patient will be discharged when clinically stable. Please note that case has been discussed with treatment team and notes have been reviewed.       Signed:  Gaye Kang  10/14/2017  6:50 PM

## 2017-10-16 NOTE — BH NOTE
Patient attended 1900 recreation group. Patient did not attend 2030 wrap up group.     Electronically signed by Yas Curtis on 10/15/2017 at 9:06 PM

## 2017-10-16 NOTE — PROGRESS NOTES
Patient isolated to room, lying in bed most of evening. Evasive and irritable. Pt states she is here because she felt like something was happening to her baby when she was at home. Pt states she is not sure if this was real or not and is not sure what she believed was happening to her baby. Pt states she feels \"a little bit paranoid\" of people. Reports \"some\" anxiety. Reports sleep has been \"hard\" and states her appetite has been fine. Denies SI or HI. Pt reports having hallucinations, but is unable to describe them. When asked if she hears or sees anything, pt states \"I don't know. \" Staff asked pt if her vitals could be checked-pt refused. Encouraged pt to come out of room. Provided pt with water pitcher. Will continue to monitor.  Electronically signed by Ronn Shetty RN on 10/15/17 at 9:41 PM

## 2017-10-16 NOTE — PROGRESS NOTES
BEHAVIORAL HEALTH FOLLOW-UP NOTE     10/16/2017     Patient was seen and examined in person, Chart reviewed   Patient's case discussed with staff/team    Chief Complaint: psychosis    Interim History:     Pt report that invega is not working  Pt is still hearing voices and paranoid  Pt live wit her fiancee  When asked about the content of voices - \" I don't know, its different things\"  Never commanding in type  Pt is paranoid that something is going to happen to her daughter  Feel very anxious and depressed    Appetite:   [] Normal/Unchanged  [] Increased  [x] Decreased      Sleep:       [] Normal/Unchanged  [] Fair       [x] Poor              Energy:    [] Normal/Unchanged  [] Increased  [x] Decreased        SI [] Present  [x] Absent    HI  []Present  [x] Absent     Aggression:  [] yes  [] no    Patient is [] able  [x] unable to CONTRACT FOR SAFETY     PAST MEDICAL/PSYCHIATRIC HISTORY:   Past Medical History:   Diagnosis Date    Depression     Headache        FAMILY/SOCIAL HISTORY:  No family history on file. Social History     Social History    Marital status: Single     Spouse name: N/A    Number of children: N/A    Years of education: N/A     Occupational History    Not on file. Social History Main Topics    Smoking status: Former Smoker    Smokeless tobacco: Not on file    Alcohol use No    Drug use: No    Sexual activity: Yes     Other Topics Concern    Not on file     Social History Narrative    No narrative on file           ROS:  [x] All negative/unchanged except if checked.  Explain positive(checked items) below:  [] Constitutional  [] Eyes  [] Ear/Nose/Mouth/Throat  [] Respiratory  [] CV  [] GI  []   [] Musculoskeletal  [] Skin/Breast  [] Neurological  [] Endocrine  [] Heme/Lymph  [] Allergic/Immunologic    Explanation:     MEDICATIONS:    Current Facility-Administered Medications:     paliperidone (INVEGA) extended release tablet 6 mg, 6 mg, Oral, QAMNina MD, 6 mg at PHENCYCLIDINESCREENURINE Neg 10/13/2017    ETOH <10 10/13/2017     Lab Results   Component Value Date    TSH 1.120 10/13/2017     No results found for: LITHIUM  No results found for: VALPROATE, CBMZ    RISK ASSESSMENT: still psychotic and paranoid    Treatment Plan:    EKG ordered  Reviewed current Medications with the patient. Since invega according to patient is not working, plan to change her meds to prolixin  Risks, benefits, side effects, drug-to-drug interactions and alternatives to treatment were discussed. Collateral information: pending  CD evaluation  Encourage patient to attend group and other milieu activities.   Discharge planning discussed with the patient and treatment team.    PSYCHOTHERAPY/COUNSELING:  [x] Therapeutic interview  [x] Supportive  [] CBT  [] Ongoing  [] Other    [x] Patient continues to need, on a daily basis, active treatment furnished directly by or requiring the supervision of inpatient psychiatric personnel      Anticipated Length of stay: 3-4 day            Electronically signed by Cristobal Stephen MD on 10/16/2017 at 11:31 AM

## 2017-10-16 NOTE — PROGRESS NOTES
Group type:healthy living held by BALTAN Stipe      Pt did attend group. Pt participation included:Verbalizing personal goals and providing appropriate feedback and comments to peers.

## 2017-10-16 NOTE — PROGRESS NOTES
Patient isolative to her room, She has constricted and worried affect. Patient verbalizes that someone is coming in her room and using her bathroom. She continues to be paranoid and question whether her child is safe. Patient is guarded during assessment and difficult to engage in conversation. Pt comes out of her room for meals and is observed eating well. She was cooperative with physical and medication compliant.

## 2017-10-16 NOTE — PLAN OF CARE
Problem: Altered Mood, Psychotic Behavior  Goal: STG-Able to demonstrate trust by eating, participating in treatment and following staff's directions  Outcome: Met This Shift    Goal: LTG-Able to verbalize decrease in frequency and intensity of hallucinations  Outcome: Ongoing    Goal: LTG-Able to verbalize reality based thinking  Outcome: Ongoing    Goal: LTG-Absence of self-harm  Outcome: Met This Shift    Goal: STG-Adequate nutritional intake  Outcome: Met This Shift    Goal: LTG-Appropriate social interaction  Outcome: Ongoing    Goal: STG-Medication therapy compliance  Outcome: Met This Shift

## 2017-10-17 PROCEDURE — 1240000000 HC EMOTIONAL WELLNESS R&B

## 2017-10-17 PROCEDURE — 6370000000 HC RX 637 (ALT 250 FOR IP): Performed by: PSYCHIATRY & NEUROLOGY

## 2017-10-17 PROCEDURE — 99232 SBSQ HOSP IP/OBS MODERATE 35: CPT | Performed by: PSYCHIATRY & NEUROLOGY

## 2017-10-17 RX ORDER — TRAZODONE HYDROCHLORIDE 50 MG/1
50 TABLET ORAL NIGHTLY
Status: DISCONTINUED | OUTPATIENT
Start: 2017-10-17 | End: 2017-10-23 | Stop reason: HOSPADM

## 2017-10-17 RX ADMIN — TRAZODONE HYDROCHLORIDE 50 MG: 50 TABLET ORAL at 20:58

## 2017-10-17 RX ADMIN — HYDROXYZINE PAMOATE 50 MG: 50 CAPSULE ORAL at 11:14

## 2017-10-17 RX ADMIN — FLUPHENAZINE HYDROCHLORIDE 5 MG: 5 TABLET, FILM COATED ORAL at 20:58

## 2017-10-17 RX ADMIN — HYDROXYZINE PAMOATE 50 MG: 50 CAPSULE ORAL at 17:16

## 2017-10-17 NOTE — PROGRESS NOTES
Pt. attended the 0900 community meeting. Electronically signed by Erika Lopez, [address] on 10/17/2017 at 9:58 AM

## 2017-10-17 NOTE — PROGRESS NOTES
Pt up on unit , noted fretful this am,  requested vistaril for anxiety this am, noted sleeping now in bed. Pt is requesting visit with her child. Noted pt eating poor and encouraged pt to eat more.

## 2017-10-17 NOTE — PROGRESS NOTES
Group Therapy Note    Date: 10/17/2017  Start Time: 1330  End Time:  1400    Number of Participants: 8    Type of Group: Psychotherapy    Patient's Goal: To spend time on interpersonal concerns. Notes:  Patient was quite for the time she was in group and she left prematurely. Status After Intervention:  Unchanged    Participation Level: Minimal    Participation Quality: Resistant      Speech:  mute      Thought Process/Content: Perseverating      Affective Functioning: Flat      Mood: anxious      Level of consciousness:  Preoccupied      Response to Learning: Progressing to goal      Endings: None Reported    Modes of Intervention: Education      Discipline Responsible: /Counselor      Signature:   HILLARY Kerns

## 2017-10-17 NOTE — PLAN OF CARE
Problem: Altered Mood, Psychotic Behavior  Goal: STG-Able to demonstrate trust by eating, participating in treatment and following staff's directions  Outcome: Ongoing    Goal: LTG-Able to verbalize decrease in frequency and intensity of hallucinations  Outcome: Ongoing    Goal: LTG-Able to verbalize reality based thinking  Outcome: Ongoing    Goal: LTG-Absence of self-harm  Outcome: Ongoing    Goal: STG-Adequate nutritional intake  Outcome: Ongoing    Goal: LTG-Appropriate social interaction  Outcome: Ongoing    Goal: STG-Medication therapy compliance  Outcome: Ongoing

## 2017-10-17 NOTE — PROGRESS NOTES
BEHAVIORAL HEALTH FOLLOW-UP NOTE     10/17/2017     Patient was seen and examined in person, Chart reviewed   Patient's case discussed with staff/team    Chief Complaint: psychosis    Interim History:   Pt is worried about her daughter  Pt is still paranoid thoughts  Pt report feeling anxious being here  Sleep disturbed - woke up alot  Appetite:   [] Normal/Unchanged  [] Increased  [x] Decreased      Sleep:       [] Normal/Unchanged  [] Fair       [x] Poor              Energy:    [] Normal/Unchanged  [] Increased  [x] Decreased        SI [] Present  [x] Absent    HI  []Present  [x] Absent     Aggression:  [] yes  [] no    Patient is [] able  [x] unable to CONTRACT FOR SAFETY     PAST MEDICAL/PSYCHIATRIC HISTORY:   Past Medical History:   Diagnosis Date    Depression     Headache        FAMILY/SOCIAL HISTORY:  No family history on file. Social History     Social History    Marital status: Single     Spouse name: N/A    Number of children: N/A    Years of education: N/A     Occupational History    Not on file. Social History Main Topics    Smoking status: Former Smoker    Smokeless tobacco: Not on file    Alcohol use No    Drug use: No    Sexual activity: Yes     Other Topics Concern    Not on file     Social History Narrative    No narrative on file           ROS:  [x] All negative/unchanged except if checked.  Explain positive(checked items) below:  [] Constitutional  [] Eyes  [] Ear/Nose/Mouth/Throat  [] Respiratory  [] CV  [] GI  []   [] Musculoskeletal  [] Skin/Breast  [] Neurological  [] Endocrine  [] Heme/Lymph  [] Allergic/Immunologic    Explanation:     MEDICATIONS:    Current Facility-Administered Medications:     fluPHENAZine HCl (PROLIXIN) tablet 5 mg, 5 mg, Oral, Nightly, Chad Alejandre MD, 5 mg at 10/16/17 2054    acetaminophen (TYLENOL) tablet 650 mg, 650 mg, Oral, Q4H PRN, Chad Alejandre MD    magnesium hydroxide (MILK OF MAGNESIA) 400 MG/5ML suspension 30 mL, 30 mL, Oral, CBMZ    RISK ASSESSMENT: still psychotic and paranoid    Treatment Plan:    EKG ordered  Reviewed current Medications with the patient. Start trazodone 50 mg po qhs  Since invega according to patient is not working, plan to change her meds to prolixin  Risks, benefits, side effects, drug-to-drug interactions and alternatives to treatment were discussed. Collateral information: pending  CD evaluation  Encourage patient to attend group and other milieu activities.   Discharge planning discussed with the patient and treatment team.    PSYCHOTHERAPY/COUNSELING:  [x] Therapeutic interview  [x] Supportive  [] CBT  [] Ongoing  [] Other    [x] Patient continues to need, on a daily basis, active treatment furnished directly by or requiring the supervision of inpatient psychiatric personnel      Anticipated Length of stay: 3-4 day            Electronically signed by Ricci Neumann MD on 10/17/2017 at 10:34 AM

## 2017-10-17 NOTE — PROGRESS NOTES
Group Therapy Note    Date: 10/17/17  Start Time: 1430  End Time:  1500    Number of Participants: 9    Type of Group: Psychoeducation    Patient's Goal:  To participate in mood management group. Notes: Patient declined to attend psychoeducation group at 1430 despite encouragement by staff.      Discipline Responsible: /Counselor    HILLARY Javier

## 2017-10-17 NOTE — CARE COORDINATION
FAMILY COLLATERAL NOTE    Family/Support Name: [facility]  Contact #:[phone]   Relationship to Pt: arturo      Placed call to above. Response:  Returned phone call to Jordyn Chairez, left voicemail to call back for collateral information.  This is second attempt to contact Jordyn Chairez for collateral.        HILLARY Alfred

## 2017-10-18 LAB
ALBUMIN SERPL-MCNC: 4.3 G/DL (ref 3.9–4.9)
ALP BLD-CCNC: 68 U/L (ref 40–130)
ALT SERPL-CCNC: 11 U/L (ref 0–33)
ANION GAP SERPL CALCULATED.3IONS-SCNC: 12 MEQ/L (ref 7–13)
AST SERPL-CCNC: 11 U/L (ref 0–35)
BASOPHILS ABSOLUTE: 0.1 K/UL (ref 0–0.2)
BASOPHILS RELATIVE PERCENT: 0.9 %
BILIRUB SERPL-MCNC: 0.4 MG/DL (ref 0–1.2)
BUN BLDV-MCNC: 6 MG/DL (ref 6–20)
CALCIUM SERPL-MCNC: 9.7 MG/DL (ref 8.6–10.2)
CHLORIDE BLD-SCNC: 100 MEQ/L (ref 98–107)
CO2: 28 MEQ/L (ref 22–29)
CREAT SERPL-MCNC: 0.46 MG/DL (ref 0.5–0.9)
EOSINOPHILS ABSOLUTE: 0 K/UL (ref 0–0.7)
EOSINOPHILS RELATIVE PERCENT: 0.2 %
GFR AFRICAN AMERICAN: >60
GFR NON-AFRICAN AMERICAN: >60
GLOBULIN: 2.7 G/DL (ref 2.3–3.5)
GLUCOSE BLD-MCNC: 137 MG/DL (ref 74–109)
HCT VFR BLD CALC: 43 % (ref 37–47)
HEMOGLOBIN: 14.3 G/DL (ref 12–16)
LYMPHOCYTES ABSOLUTE: 1.7 K/UL (ref 1–4.8)
LYMPHOCYTES RELATIVE PERCENT: 29.8 %
MAGNESIUM: 2.2 MG/DL (ref 1.7–2.3)
MCH RBC QN AUTO: 29.9 PG (ref 27–31.3)
MCHC RBC AUTO-ENTMCNC: 33.3 % (ref 33–37)
MCV RBC AUTO: 89.6 FL (ref 82–100)
MONOCYTES ABSOLUTE: 0.7 K/UL (ref 0.2–0.8)
MONOCYTES RELATIVE PERCENT: 11.4 %
NEUTROPHILS ABSOLUTE: 3.4 K/UL (ref 1.4–6.5)
NEUTROPHILS RELATIVE PERCENT: 57.7 %
PDW BLD-RTO: 12.5 % (ref 11.5–14.5)
PLATELET # BLD: 265 K/UL (ref 130–400)
POTASSIUM SERPL-SCNC: 3.9 MEQ/L (ref 3.5–5.1)
PREALBUMIN: 16.7 MG/DL (ref 20–40)
RBC # BLD: 4.79 M/UL (ref 4.2–5.4)
SODIUM BLD-SCNC: 140 MEQ/L (ref 132–144)
TOTAL PROTEIN: 7 G/DL (ref 6.4–8.1)
WBC # BLD: 5.8 K/UL (ref 4.8–10.8)

## 2017-10-18 PROCEDURE — 99232 SBSQ HOSP IP/OBS MODERATE 35: CPT | Performed by: PSYCHIATRY & NEUROLOGY

## 2017-10-18 PROCEDURE — 84134 ASSAY OF PREALBUMIN: CPT

## 2017-10-18 PROCEDURE — 36415 COLL VENOUS BLD VENIPUNCTURE: CPT

## 2017-10-18 PROCEDURE — 83735 ASSAY OF MAGNESIUM: CPT

## 2017-10-18 PROCEDURE — 1240000000 HC EMOTIONAL WELLNESS R&B

## 2017-10-18 PROCEDURE — 6370000000 HC RX 637 (ALT 250 FOR IP): Performed by: PSYCHIATRY & NEUROLOGY

## 2017-10-18 PROCEDURE — 80053 COMPREHEN METABOLIC PANEL: CPT

## 2017-10-18 PROCEDURE — 85025 COMPLETE CBC W/AUTO DIFF WBC: CPT

## 2017-10-18 RX ORDER — FLUPHENAZINE HYDROCHLORIDE 5 MG/1
5 TABLET ORAL 2 TIMES DAILY
Status: DISCONTINUED | OUTPATIENT
Start: 2017-10-18 | End: 2017-10-23 | Stop reason: HOSPADM

## 2017-10-18 RX ADMIN — TRAZODONE HYDROCHLORIDE 50 MG: 50 TABLET ORAL at 20:51

## 2017-10-18 RX ADMIN — HYDROXYZINE PAMOATE 50 MG: 50 CAPSULE ORAL at 10:15

## 2017-10-18 RX ADMIN — FLUPHENAZINE HYDROCHLORIDE 5 MG: 5 TABLET, FILM COATED ORAL at 20:51

## 2017-10-18 RX ADMIN — HYDROXYZINE PAMOATE 50 MG: 50 CAPSULE ORAL at 18:49

## 2017-10-18 NOTE — PROGRESS NOTES
BEHAVIORAL HEALTH FOLLOW-UP NOTE     10/18/2017     Patient was seen and examined in person, Chart reviewed   Patient's case discussed with staff/team    Chief Complaint: psychosis    Interim History:   Pt is worried about her daughter. Her fiancee is looking after her child and there were some concerns raised from the collateral obtained  Pt is still paranoid thoughts  Pt report feeling anxious being here  Sleep disturbed - woke up alot  Appetite:   [] Normal/Unchanged  [] Increased  [x] Decreased      Sleep:       [] Normal/Unchanged  [] Fair       [x] Poor              Energy:    [] Normal/Unchanged  [] Increased  [x] Decreased        SI [] Present  [x] Absent    HI  []Present  [x] Absent     Aggression:  [] yes  [] no    Patient is [] able  [x] unable to CONTRACT FOR SAFETY     PAST MEDICAL/PSYCHIATRIC HISTORY:   Past Medical History:   Diagnosis Date    Depression     Headache        FAMILY/SOCIAL HISTORY:  No family history on file. Social History     Social History    Marital status: Single     Spouse name: N/A    Number of children: N/A    Years of education: N/A     Occupational History    Not on file. Social History Main Topics    Smoking status: Former Smoker    Smokeless tobacco: Not on file    Alcohol use No    Drug use: No    Sexual activity: Yes     Other Topics Concern    Not on file     Social History Narrative    No narrative on file           ROS:  [x] All negative/unchanged except if checked.  Explain positive(checked items) below:  [] Constitutional  [] Eyes  [] Ear/Nose/Mouth/Throat  [] Respiratory  [] CV  [] GI  []   [] Musculoskeletal  [] Skin/Breast  [] Neurological  [] Endocrine  [] Heme/Lymph  [] Allergic/Immunologic    Explanation:     MEDICATIONS:    Current Facility-Administered Medications:     traZODone (DESYREL) tablet 50 mg, 50 mg, Oral, Nightly, Rona Nicole MD, 50 mg at 10/17/17 2058    fluPHENAZine HCl (PROLIXIN) tablet 5 mg, 5 mg, Oral, Nightly, Zoe Bustillo

## 2017-10-18 NOTE — PROGRESS NOTES
Group Therapy Note    Date: 10/18/2017  Start Time:1440  End Time: 1448    Number of Participants: 9    Type of Group: Psychoeducation    Patient's Goal:  To participate in mood management group. Notes:  Patient attended briefly and left prematurely. Status After Intervention:  Unchanged    Participation Level: Minimal    Participation Quality: Resistant      Speech:  hesitant      Thought Process/Content: Perseverating      Affective Functioning: Flat      Mood: anxious      Level of consciousness:  Preoccupied      Response to Learning: Progressing to goal      Endings: None Reported    Modes of Intervention: Education      Discipline Responsible: /Counselor      Signature:   HILLARY Nuñez

## 2017-10-18 NOTE — PROGRESS NOTES
Pt. refused to attend the 1000 skills group, despite staff encouragement. Electronically signed by Randee Sheffield, [address] on 10/18/2017 at 1:42 PM

## 2017-10-18 NOTE — BH NOTE
Group Therapy Note    Date: 10/17/2017  Start Time: 1600  End Time:  1630  Number of Participants: 11    Type of Group: Healthy Living/Wellness    Notes:  Patient in group but leaves soon after it begins.     Status After Intervention:  Unchanged    Participation Level: None    Participation Quality: Resistant      Speech:  hesitant      Thought Process/Content: Logical      Affective Functioning: Flat      Mood: dysphoric      Level of consciousness:  Inattentive      Response to Learning: Resistant      Endings: None Reported    Modes of Intervention: Education      Discipline Responsible: [address] [place] [address]    Electronically signed by Moses Leonard on 10/17/2017 at 11:02 PM

## 2017-10-18 NOTE — CARE COORDINATION
Calm and cooperative. Denies all. Denies paranoia, but watchful and guarded. Soft spoken. Clumps of hair noted on bed sheets. Pt. Denies pulling hair out and believes it may be due to not eating well. 2/10 depression and 7/10 anxiety. Reports poor sleep and appetite is improving. Thought blocking noted and pt. Says, \"I feel a little confused. \"

## 2017-10-18 NOTE — BH NOTE
Group Therapy Note    Date: 10/17/2017  Start Time: 2030  End Time:  2100  Number of Participants: 7    Type of Group: Wrap-Up    Notes:  Patient shared with peers.     Status After Intervention:  Improved    Participation Level: Interactive    Participation Quality: Appropriate and Sharing      Speech:  normal      Thought Process/Content: Logical      Affective Functioning: Congruent      Mood: euthymic      Level of consciousness:  Oriented x4      Response to Learning: Progressing to goal      Endings: None Reported    Modes of Intervention: Support and Socialization      Discipline Responsible: Behavorial Health Tech    Electronically signed by Patricia Ponce on 10/17/2017 at 11:04 PM

## 2017-10-18 NOTE — PROGRESS NOTES
Group Therapy Note    Date: 10/18/17  Start Time:1330  End Time: 1400    Number of Participants:5    Type of Group: Psychotherapy    Patient's Goal:  To connect and validate feelings of group members. Notes: Patient declined to attend psychoeducation group at 1330 despite encouragement by staff.      Discipline Responsible: /Counselor    HILLARY Haley

## 2017-10-18 NOTE — BH NOTE
Patient did not attend Recreation group at 1900. Patient did not attend Relaxation group at 2100.     Electronically signed by Diandra Montague on 10/17/2017 at 11:02 PM

## 2017-10-18 NOTE — PROGRESS NOTES
Pt. attended the 0900 community meeting. Electronically signed by Chaz Barajas [address] on 10/18/2017 at 9:46 AM

## 2017-10-19 PROCEDURE — 6370000000 HC RX 637 (ALT 250 FOR IP): Performed by: PSYCHIATRY & NEUROLOGY

## 2017-10-19 PROCEDURE — 1240000000 HC EMOTIONAL WELLNESS R&B

## 2017-10-19 PROCEDURE — 99232 SBSQ HOSP IP/OBS MODERATE 35: CPT | Performed by: PSYCHIATRY & NEUROLOGY

## 2017-10-19 RX ADMIN — HYDROXYZINE PAMOATE 50 MG: 50 CAPSULE ORAL at 15:33

## 2017-10-19 RX ADMIN — FLUPHENAZINE HYDROCHLORIDE 5 MG: 5 TABLET, FILM COATED ORAL at 20:58

## 2017-10-19 RX ADMIN — TRAZODONE HYDROCHLORIDE 50 MG: 50 TABLET ORAL at 20:58

## 2017-10-19 RX ADMIN — FLUPHENAZINE HYDROCHLORIDE 5 MG: 5 TABLET, FILM COATED ORAL at 09:25

## 2017-10-19 RX ADMIN — HYDROXYZINE PAMOATE 50 MG: 50 CAPSULE ORAL at 09:35

## 2017-10-19 NOTE — BH NOTE
Group Therapy Note    Date: 10/18/2017  Start Time: 1600  End Time:  1630  Number of Participants: 11    Type of Group: Healthy Living/Wellness    Notes:  Patient was quiet but appropriate in group.     Status After Intervention:  Improved    Participation Level: Interactive    Participation Quality: Appropriate      Speech:  normal      Thought Process/Content: Logical      Affective Functioning: Congruent      Mood: euthymic      Level of consciousness:  Alert and Oriented x4      Response to Learning: Progressing to goal      Endings: None Reported    Modes of Intervention: Education      Discipline Responsible: [address] [place] [address]    Electronically signed by Patricia Ponce on 10/18/2017 at 10:49 PM

## 2017-10-19 NOTE — PROGRESS NOTES
normal S1 and S2  ABDOMEN:  normal bowel sounds and non-tender  MUSCULOSKELETAL:  there is no redness, warmth, or swelling of the joints  full range of motion noted  NEUROLOGIC:  Mental Status Exam:  Level of Alertness:   awake  Orientation:   person, place, time  SKIN:  normal skin color, texture, turgor    DATA:     Recent Results (from the past 24 hour(s))   PREALBUMIN    Collection Time: 10/18/17  8:58 PM   Result Value Ref Range    Prealbumin 16.7 (L) 20.0 - 40.0 mg/dL   Comprehensive Metabolic Panel    Collection Time: 10/18/17  8:58 PM   Result Value Ref Range    Sodium 140 132 - 144 mEq/L    Potassium 3.9 3.5 - 5.1 mEq/L    Chloride 100 98 - 107 mEq/L    CO2 28 22 - 29 mEq/L    Anion Gap 12 7 - 13 mEq/L    Glucose 137 (H) 74 - 109 mg/dL    BUN 6 6 - 20 mg/dL    CREATININE 0.46 (L) 0.50 - 0.90 mg/dL    GFR Non-African American >60.0 >60    GFR  >60.0 >60    Calcium 9.7 8.6 - 10.2 mg/dL    Total Protein 7.0 6.4 - 8.1 g/dL    Alb 4.3 3.9 - 4.9 g/dL    Total Bilirubin 0.4 0.0 - 1.2 mg/dL    Alkaline Phosphatase 68 40 - 130 U/L    ALT 11 0 - 33 U/L    AST 11 0 - 35 U/L    Globulin 2.7 2.3 - 3.5 g/dL   CBC Auto Differential    Collection Time: 10/18/17  8:58 PM   Result Value Ref Range    WBC 5.8 4.8 - 10.8 K/uL    RBC 4.79 4.20 - 5.40 M/uL    Hemoglobin 14.3 12.0 - 16.0 g/dL    Hematocrit 43.0 37.0 - 47.0 %    MCV 89.6 82.0 - 100.0 fL    MCH 29.9 27.0 - 31.3 pg    MCHC 33.3 33.0 - 37.0 %    RDW 12.5 11.5 - 14.5 %    Platelets 549 882 - 145 K/uL    Neutrophils % 57.7 %    Lymphocytes % 29.8 %    Monocytes % 11.4 %    Eosinophils % 0.2 %    Basophils % 0.9 %    Neutrophils # 3.4 1.4 - 6.5 K/uL    Lymphocytes # 1.7 1.0 - 4.8 K/uL    Monocytes # 0.7 0.2 - 0.8 K/uL    Eosinophils # 0.0 0.0 - 0.7 K/uL    Basophils # 0.1 0.0 - 0.2 K/uL   Magnesium    Collection Time: 10/18/17  8:58 PM   Result Value Ref Range    Magnesium 2.2 1.7 - 2.3 mg/dL       ASSESSMENT AND PLAN   1. Anorexia  2.   Hair loss    Dietary consult  Albumin and TSH WNL, will repeat cbc, bmp and check pre-albumin  Cont tx per psych attending      SIGNATURE: Mina Moreno PA-C PATIENT NAME: Liliana Finney   DATE: October 18, 2017 MRN: [id number]   TIME: 10:11 PM PAGER: [phone]     Dr. Radha Mace MD, [address] [place] [address]

## 2017-10-19 NOTE — BH NOTE
Group Therapy Note    Date: 10/18/2017  Start Time: 2030  End Time:  2100  Number of Participants: 5    Type of Group: Wrap-Up    Patient's Goal: \"To get better\"    Notes:  Patient states she is feeling better since being admitted here. Patient continues to be fixated on seeing her family and being discharged. Status After Intervention:  Improved    Participation Level:  Active Listener    Participation Quality: Appropriate and Attentive      Speech:  normal      Thought Process/Content: Logical      Affective Functioning: Congruent      Mood: euthymic      Level of consciousness:  Alert and Oriented x4      Response to Learning: Progressing to goal      Endings: None Reported    Modes of Intervention: Support and Socialization      Discipline Responsible: Behavorial Health Tech    Electronically signed by Madhav Goff on 10/18/2017 at 10:52 PM

## 2017-10-19 NOTE — PLAN OF CARE
Problem: Altered Mood, Psychotic Behavior  Goal: STG-Able to demonstrate trust by eating, participating in treatment and following staff's directions  Outcome: Ongoing    Goal: LTG-Able to verbalize decrease in frequency and intensity of hallucinations  Outcome: Ongoing    Goal: LTG-Able to verbalize reality based thinking  Outcome: Ongoing    Goal: LTG-Absence of self-harm  Outcome: Completed Date Met: 10/18/17    Goal: STG-Adequate nutritional intake  Outcome: Ongoing    Goal: LTG-Appropriate social interaction  Outcome: Not Met This Shift    Goal: STG-Medication therapy compliance  Outcome: Ongoing

## 2017-10-19 NOTE — PROGRESS NOTES
Q4H PRN, Katherin Aguirre MD    magnesium hydroxide (MILK OF MAGNESIA) 400 MG/5ML suspension 30 mL, 30 mL, Oral, Daily PRN, Katherin Aguirre MD, 30 mL at 10/15/17 1119    haloperidol (HALDOL) tablet 5 mg, 5 mg, Oral, Q6H PRN, 5 mg at 10/14/17 0923 **OR** haloperidol lactate (HALDOL) injection 5 mg, 5 mg, Intramuscular, Q6H PRN, Katherin Aguirre MD    hydrOXYzine (VISTARIL) capsule 50 mg, 50 mg, Oral, Q6H PRN, 50 mg at 10/19/17 0935 **OR** hydrOXYzine (VISTARIL) injection 50 mg, 50 mg, Intramuscular, Q6H PRN, Katherin Aguirre MD      Examination:  /89   Pulse 123   Temp 98 °F (36.7 °C)   Resp 18   Ht 5' 7\" (1.702 m)   Wt 115 lb (52.2 kg)   LMP  (LMP Unknown)   SpO2 95%   Breastfeeding?  No   BMI 18.01 kg/m²   Gait - steady  Medication side effects(SE): no    Mental Status Examination:    Level of consciousness:  within normal limits   Appearance:  poor grooming and poor hygiene  Behavior/Motor:  psychomotor retardation  Attitude toward examiner:  evasive and guarded  Speech:  slow   Mood: dysthymic  Affect:  mood incongruent  Thought processes:  slow   Thought content:  Suicidal Ideation:  denies suicidal ideation  Delusions:  paranoid  Perceptual Disturbance:  auditory  Cognition:  oriented to person, place, and time   Concentration distractible  Insight poor   Judgement poor     ASSESSMENT:   Patient symptoms are:  [] Well controlled  [] Improving  [] Worsening  [x] No change      Diagnosis:   Principal Problem:    Paranoid schizophrenia ([facility] 75.)      LABS:    Recent Labs      10/18/17   2058   WBC  5.8   HGB  14.3   PLT  265     Recent Labs      10/18/17   2058   NA  140   K  3.9   CL  100   CO2  28   BUN  6   CREATININE  0.46*   GLUCOSE  137*     Recent Labs      10/18/17   2058   BILITOT  0.4   ALKPHOS  68   AST  11   ALT  11     Lab Results   Component Value Date    LABAMPH POSITIVE 10/13/2017    BARBSCNU Neg 10/13/2017    LABBENZ POSITIVE 10/13/2017    OPIATESCREENURINE Neg 10/13/2017 PHENCYCLIDINESCREENURINE Neg 10/13/2017    ETOH <10 10/13/2017     Lab Results   Component Value Date    TSH 1.120 10/13/2017     No results found for: LITHIUM  No results found for: VALPROATE, CBMZ      Treatment Plan:  Reviewed current Medications with the patient. Prolixin decanoate discussed with the patient  Pt not interested in the [address] [place] [address]. But agreeable to think about it  Risks, benefits, side effects, drug-to-drug interactions and alternatives to treatment were discussed. Collateral information: reviewed  CD evaluation  Encourage patient to attend group and other milieu activities.   Discharge planning discussed with the patient and treatment team.    PSYCHOTHERAPY/COUNSELING:  [x] Therapeutic interview  [x] Supportive  [] CBT  [] Ongoing  [] Other    [x] Patient continues to need, on a daily basis, active treatment furnished directly by or requiring the supervision of inpatient psychiatric personnel      Anticipated Length of stay:            Electronically signed by Tamie Syed MD on 10/19/2017 at 9:58 AM

## 2017-10-19 NOTE — PROGRESS NOTES
Pt. refused to attend the 0930 skills group, due to resting in room despite staff encouragement. Electronically signed by Lance Chery, [address] on 10/19/2017 at 12:14 PM

## 2017-10-20 PROCEDURE — 99232 SBSQ HOSP IP/OBS MODERATE 35: CPT | Performed by: PSYCHIATRY & NEUROLOGY

## 2017-10-20 PROCEDURE — 6370000000 HC RX 637 (ALT 250 FOR IP): Performed by: PSYCHIATRY & NEUROLOGY

## 2017-10-20 PROCEDURE — 1240000000 HC EMOTIONAL WELLNESS R&B

## 2017-10-20 RX ADMIN — FLUPHENAZINE HYDROCHLORIDE 5 MG: 5 TABLET, FILM COATED ORAL at 20:59

## 2017-10-20 RX ADMIN — HYDROXYZINE PAMOATE 50 MG: 50 CAPSULE ORAL at 10:18

## 2017-10-20 RX ADMIN — TRAZODONE HYDROCHLORIDE 50 MG: 50 TABLET ORAL at 20:59

## 2017-10-20 RX ADMIN — FLUPHENAZINE HYDROCHLORIDE 5 MG: 5 TABLET, FILM COATED ORAL at 09:06

## 2017-10-20 RX ADMIN — HYDROXYZINE PAMOATE 50 MG: 50 CAPSULE ORAL at 18:57

## 2017-10-20 NOTE — PROGRESS NOTES
Group Therapy Note    Date: 10/20/2017  Start Time: 1000  End Time:  0380  Number of Participants: 5    Type of Group: Psychoeducation    Wellness Binder Information  Module Name:    Session Number:      Patient's Goal:  \"to work on going home\"    Notes:  Pt. attended the skill group. Pt. was easily distracted and left session prematurely.      Status After Intervention:  Unchanged    Participation Level: None    Participation Quality: Appropriate      Speech:  normal      Thought Process/Content: Logical      Affective Functioning: Flat      Mood: depressed      Level of consciousness:  Preoccupied      Response to Learning: Progressing to goal      Endings: None Reported    Modes of Intervention: Education, Support, Socialization and Activity      Discipline Responsible: Psychoeducational Specialist      Signature:  Patricia Olson

## 2017-10-20 NOTE — PROGRESS NOTES
Group Therapy Note    Date: 10/20/2017  Start Time: 11am  End Time:  12 noon  Number of Participants: 5    Type of Group: Psychotherapy    Wellness Binder Information  Module Name:  psychotherapy  Session Number:      Patient's Goal:  Improve coping, receive support  Notes:  Did not engage in group    Status After Intervention:  Unchanged    Participation Level: None    Participation Quality: Resistant      Speech:  hesitant      Thought Process/Content: Hallucinating  Flight of ideas      Affective Functioning: Constricted/Restricted      Mood: dysphoric      Level of consciousness:  Preoccupied and Inattentive      Response to Learning: Resistant      Endings: None Reported    Modes of Intervention: Education, Support, Socialization and Exploration      Discipline Responsible: /Counselor      Signature:  HILLARY Fry

## 2017-10-20 NOTE — PROGRESS NOTES
BEHAVIORAL HEALTH FOLLOW-UP NOTE     10/20/2017     Patient was seen and examined in person, Chart reviewed   Patient's case discussed with staff/team    Chief Complaint: psychosis2    Interim History:     Continue to remain paranoid  LEss intense hallucination  Pt gave permission to contact her BF mom, who takes care of the children  Sleep better  Pt is d/c focused    Appetite:   [x] Normal/Unchanged  [] Increased  [] Decreased      Sleep:       [] Normal/Unchanged  [x] Fair       [] Poor              Energy:    [] Normal/Unchanged  [] Increased  [x] Decreased        SI [] Present  [x] Absent    HI  []Present  [x] Absent     Aggression:  [] yes  [] no    Patient is [] able  [] unable to CONTRACT FOR SAFETY     PAST MEDICAL/PSYCHIATRIC HISTORY:   Past Medical History:   Diagnosis Date    Depression     Headache        FAMILY/SOCIAL HISTORY:  No family history on file. Social History     Social History    Marital status: Single     Spouse name: N/A    Number of children: N/A    Years of education: N/A     Occupational History    Not on file. Social History Main Topics    Smoking status: Former Smoker    Smokeless tobacco: Not on file    Alcohol use No    Drug use: No    Sexual activity: Yes     Other Topics Concern    Not on file     Social History Narrative    No narrative on file           ROS:  [x] All negative/unchanged except if checked.  Explain positive(checked items) below:  [] Constitutional  [] Eyes  [] Ear/Nose/Mouth/Throat  [] Respiratory  [] CV  [] GI  []   [] Musculoskeletal  [] Skin/Breast  [] Neurological  [] Endocrine  [] Heme/Lymph  [] Allergic/Immunologic    Explanation:     MEDICATIONS:    Current Facility-Administered Medications:     fluPHENAZine HCl (PROLIXIN) tablet 5 mg, 5 mg, Oral, BID, Lazaro Mckeon MD, 5 mg at 10/20/17 0906    traZODone (DESYREL) tablet 50 mg, 50 mg, Oral, Nightly, Lazaro Mckeon MD, 50 mg at 10/19/17 2058    acetaminophen (TYLENOL) tablet 650 Results   Component Value Date    TSH 1.120 10/13/2017     No results found for: LITHIUM  No results found for: VALPROATE, CBMZ    Treatment Plan:  Reviewed current Medications with the patient. Recommended OG abilify- pt refusing   Risks, benefits, side effects, drug-to-drug interactions and alternatives to treatment were discussed. Collateral information  CD evaluation  Encourage patient to attend group and other milieu activities.   Discharge planning discussed with the patient and treatment team.    PSYCHOTHERAPY/COUNSELING:  [x] Therapeutic interview  [x] Supportive  [] CBT  [] Ongoing  [] Other    [x] Patient continues to need, on a daily basis, active treatment furnished directly by or requiring the supervision of inpatient psychiatric personnel      Anticipated Length of stay:            Electronically signed by Melissa Maddox MD on 10/20/2017 at 10:39 AM

## 2017-10-20 NOTE — BH NOTE
Group Therapy Note    Date: 10/19/2017  Start Time: 1630  End Time:  1700  Number of Participants: 11    Type of Group: Healthy Living/Wellness    Patient's Goal:  To learn about assertive communication. Notes:  Patient was quiet but appropriate in group. Status After Intervention:  Improved    Participation Level:  Active Listener    Participation Quality: Appropriate and Attentive      Speech:  normal and hesitant      Thought Process/Content: Logical      Affective Functioning: Congruent      Mood: euthymic      Level of consciousness:  Oriented x4      Response to Learning: Progressing to goal      Endings: None Reported    Modes of Intervention: Education      Discipline Responsible: [address] [place] [address]    Electronically signed by Mirta Rincon on 10/19/2017 at 10:49 PM

## 2017-10-20 NOTE — BH NOTE
Patient did not attend Recreation group at 2940. Patient did not attend Relaxation group at 2050.     Electronically signed by Kristina Tripp on 10/19/2017 at 10:49 PM

## 2017-10-20 NOTE — PROGRESS NOTES
Out on unit a little more today Voices have receded to [facility] to eat patient thinks she eats just fine

## 2017-10-20 NOTE — PLAN OF CARE
Problem: Altered Mood, Psychotic Behavior  Goal: STG-Able to demonstrate trust by eating, participating in treatment and following staff's directions  Outcome: Met This Shift    Goal: LTG-Able to verbalize decrease in frequency and intensity of hallucinations  Outcome: Ongoing    Goal: LTG-Able to verbalize reality based thinking  Outcome: Ongoing    Goal: STG-Adequate nutritional intake  Outcome: Ongoing    Goal: LTG-Appropriate social interaction  Outcome: Ongoing    Goal: STG-Medication therapy compliance  Outcome: Met This Shift

## 2017-10-20 NOTE — PLAN OF CARE
Problem: Altered Mood, Psychotic Behavior  Goal: STG-Able to demonstrate trust by eating, participating in treatment and following staff's directions  Outcome: Met This Shift    Goal: LTG-Able to verbalize decrease in frequency and intensity of hallucinations  Outcome: Met This Shift    Goal: LTG-Able to verbalize reality based thinking  Outcome: Met This Shift    Goal: STG-Adequate nutritional intake  Outcome: Ongoing    Goal: LTG-Appropriate social interaction  Outcome: Ongoing    Goal: STG-Medication therapy compliance  Outcome: Ongoing

## 2017-10-21 PROCEDURE — 1240000000 HC EMOTIONAL WELLNESS R&B

## 2017-10-21 PROCEDURE — 6370000000 HC RX 637 (ALT 250 FOR IP): Performed by: PSYCHIATRY & NEUROLOGY

## 2017-10-21 RX ADMIN — FLUPHENAZINE HYDROCHLORIDE 5 MG: 5 TABLET, FILM COATED ORAL at 20:44

## 2017-10-21 RX ADMIN — TRAZODONE HYDROCHLORIDE 50 MG: 50 TABLET ORAL at 20:44

## 2017-10-21 RX ADMIN — HYDROXYZINE PAMOATE 50 MG: 50 CAPSULE ORAL at 09:54

## 2017-10-21 RX ADMIN — FLUPHENAZINE HYDROCHLORIDE 5 MG: 5 TABLET, FILM COATED ORAL at 08:31

## 2017-10-21 NOTE — PROGRESS NOTES
(PROLIXIN) tablet 5 mg, 5 mg, Oral, BID, Elle Dodson MD, 5 mg at 10/21/17 0831    traZODone (DESYREL) tablet 50 mg, 50 mg, Oral, Nightly, Elle Dodson MD, 50 mg at 10/20/17 2059    acetaminophen (TYLENOL) tablet 650 mg, 650 mg, Oral, Q4H PRN, Elle Dodson MD    magnesium hydroxide (MILK OF MAGNESIA) 400 MG/5ML suspension 30 mL, 30 mL, Oral, Daily PRN, Elle Dodson MD, 30 mL at 10/15/17 1119    haloperidol (HALDOL) tablet 5 mg, 5 mg, Oral, Q6H PRN, 5 mg at 10/14/17 0923 **OR** haloperidol lactate (HALDOL) injection 5 mg, 5 mg, Intramuscular, Q6H PRN, Elle Dodson MD    hydrOXYzine (VISTARIL) capsule 50 mg, 50 mg, Oral, Q6H PRN, 50 mg at 10/21/17 0954 **OR** hydrOXYzine (VISTARIL) injection 50 mg, 50 mg, Intramuscular, Q6H PRN, Elle Dodson MD      Examination:  /82   Pulse 65   Temp (!) 80 °F (26.7 °C)   Resp 16   Ht 5' 7\" (1.702 m)   Wt 115 lb (52.2 kg)   LMP  (LMP Unknown)   SpO2 100%   Breastfeeding?  No   BMI 18.01 kg/m²   Gait - steady  Medication side effects(SE): no    Mental Status Examination:    Level of consciousness:  within normal limits   Appearance:  poor grooming and poor hygiene  Behavior/Motor:  psychomotor retardation  Attitude toward examiner:  cooperative  Speech:  slow   Mood: anxious  Affect:  flat  Thought processes:  slow   Thought content:  Suicidal Ideation:  denies suicidal ideation  Cognition:  oriented to person, place, and time   Concentration poor  Insight poor   Judgement fair     ASSESSMENT:   Patient symptoms are:  [] Well controlled  [] Improving  [] Worsening  [x] No change      Diagnosis:   Principal Problem:    Paranoid schizophrenia ([facility]      LABS:    Recent Labs      10/18/17   2058   WBC  5.8   HGB  14.3   PLT  265     Recent Labs      10/18/17   2058   NA  140   K  3.9   CL  100   CO2  28   BUN  6   CREATININE  0.46*   GLUCOSE  137*     Recent Labs      10/18/17   2058   BILITOT  0.4   ALKPHOS  68   AST  11   ALT  11     Lab

## 2017-10-21 NOTE — PLAN OF CARE
Problem: Altered Mood, Psychotic Behavior  Goal: STG-Able to demonstrate trust by eating, participating in treatment and following staff's directions  Outcome: Met This Shift    Goal: LTG-Able to verbalize decrease in frequency and intensity of hallucinations  Outcome: Ongoing    Goal: LTG-Able to verbalize reality based thinking  Outcome: Ongoing    Goal: STG-Adequate nutritional intake  Outcome: Ongoing    Goal: LTG-Appropriate social interaction  Outcome: Ongoing    Goal: STG-Medication therapy compliance  Outcome: Met This Shift      Problem: Nutrition  Goal: Optimal nutrition therapy  Outcome: Ongoing

## 2017-10-21 NOTE — PLAN OF CARE
Problem: Altered Mood, Psychotic Behavior  Goal: STG-Able to demonstrate trust by eating, participating in treatment and following staff's directions  Outcome: Ongoing    Goal: LTG-Able to verbalize decrease in frequency and intensity of hallucinations  Outcome: Ongoing    Goal: LTG-Able to verbalize reality based thinking  Outcome: Ongoing    Goal: STG-Adequate nutritional intake  Outcome: Not Met This Shift    Goal: LTG-Appropriate social interaction  Outcome: Not Met This Shift    Goal: STG-Medication therapy compliance  Outcome: Met This Shift      Problem: Nutrition  Goal: Optimal nutrition therapy  Outcome: Not Met This Shift

## 2017-10-21 NOTE — PROGRESS NOTES
Pt. refused to attend the 1000 skills group, despite staff encouragement. Electronically signed by Min Christianson, [address] on 10/21/2017 at 12:50 PM

## 2017-10-22 PROCEDURE — 1240000000 HC EMOTIONAL WELLNESS R&B

## 2017-10-22 PROCEDURE — 6370000000 HC RX 637 (ALT 250 FOR IP): Performed by: PSYCHIATRY & NEUROLOGY

## 2017-10-22 RX ORDER — MIRTAZAPINE 15 MG/1
15 TABLET, ORALLY DISINTEGRATING ORAL NIGHTLY
Status: DISCONTINUED | OUTPATIENT
Start: 2017-10-22 | End: 2017-10-23 | Stop reason: HOSPADM

## 2017-10-22 RX ADMIN — MIRTAZAPINE 15 MG: 15 TABLET, ORALLY DISINTEGRATING ORAL at 22:57

## 2017-10-22 RX ADMIN — HYDROXYZINE PAMOATE 50 MG: 50 CAPSULE ORAL at 08:30

## 2017-10-22 RX ADMIN — FLUPHENAZINE HYDROCHLORIDE 5 MG: 5 TABLET, FILM COATED ORAL at 08:30

## 2017-10-22 RX ADMIN — TRAZODONE HYDROCHLORIDE 50 MG: 50 TABLET ORAL at 20:47

## 2017-10-22 RX ADMIN — FLUPHENAZINE HYDROCHLORIDE 5 MG: 5 TABLET, FILM COATED ORAL at 20:47

## 2017-10-22 RX ADMIN — HYDROXYZINE PAMOATE 50 MG: 50 CAPSULE ORAL at 19:17

## 2017-10-22 NOTE — PROGRESS NOTES
Pt asks for vistaril with morning meds, given.  Electronically signed by Ava Hughes LPN on 05/48/4053 at 8:57 AM

## 2017-10-22 NOTE — PROGRESS NOTES
Patient did not attend wellness group at 1600, recreation group at 1900, wrapup group 2030, or relaxation group at 2045.  Electronically signed by Benedicto Lynn LPN on 61/13/2347 at 10:45 PM

## 2017-10-22 NOTE — PLAN OF CARE
Problem: Altered Mood, Psychotic Behavior  Goal: STG-Able to demonstrate trust by eating, participating in treatment and following staff's directions  Outcome: Ongoing    Goal: LTG-Able to verbalize decrease in frequency and intensity of hallucinations  Outcome: Ongoing    Goal: LTG-Able to verbalize reality based thinking  Outcome: Ongoing    Goal: STG-Adequate nutritional intake  Outcome: Ongoing    Goal: LTG-Appropriate social interaction  Outcome: Ongoing    Goal: STG-Medication therapy compliance  Outcome: Ongoing

## 2017-10-22 NOTE — PROGRESS NOTES
Pt is asks about her appetite and weight. Pt does not eat breakfast this am and states that nothing on her tray is good. Pt does take her Ensure into her room but is not seen drinking it. Patient is asked what her normal weight is, she states \"it fluctuates. \"  Patient is again encouraged to eat more even if she is not feeling hungry. Pt is educated about getting good nutrition. Patient states \"I am picky. \"  RN is notified that pt is not seen eating. Pt is also encouraged to make sure is drinking fluids, espcially water.  Electronically signed by Roberth Camacho LPN on 13/74/6944 at 9:12 AM

## 2017-10-22 NOTE — PROGRESS NOTES
Group Therapy Note    Date: 10/22/17  Start Time: 1010  End Time:  7692    Number of Participants:6    Type of Group: Psychoeducation    Patient's Goal:  To participate in activities group. Notes: Patient declined to attend psychoeducation group at 1010 despite encouragement by staff.      Discipline Responsible: /Counselor    HILLARY Martinez

## 2017-10-22 NOTE — PROGRESS NOTES
[facility] FOLLOW-UP NOTE     10/22/2017     Patient was seen and examined in person, Chart reviewed   Patient's case discussed with staff/team    Chief Complaint: psychosis    Interim History:     Patient says her sleep is \"not too good\". She said her appetite was still low. She denied having any suicidal or homicidal ideations, denied hallucinations or paranoia/delusions, but seemed paranoid and guarded during the interview. Staff reports that she is still not eating and self-isolating, and not taking care of her ADL's. She expressed a desire to return home, and take care of her daughter. Appetite:   [x] Normal/Unchanged  [] Increased  [] Decreased      Sleep:       [] Normal/Unchanged  [x] Fair       [] Poor              Energy:    [] Normal/Unchanged  [] Increased  [x] Decreased        SI [] Present  [x] Absent    HI  []Present  [x] Absent     Aggression:  [] yes  [] no    Patient is [] able  [x] unable to CONTRACT FOR SAFETY     PAST MEDICAL/PSYCHIATRIC HISTORY:   Past Medical History:   Diagnosis Date    Depression     Headache        FAMILY/SOCIAL HISTORY:  No family history on file. Social History     Social History    Marital status: Single     Spouse name: N/A    Number of children: N/A    Years of education: N/A     Occupational History    Not on file. Social History Main Topics    Smoking status: Former Smoker    Smokeless tobacco: Not on file    Alcohol use No    Drug use: No    Sexual activity: Yes     Other Topics Concern    Not on file     Social History Narrative    No narrative on file           ROS:  [x] All negative/unchanged except if checked.  Explain positive(checked items) below:  [] Constitutional  [] Eyes  [] Ear/Nose/Mouth/Throat  [] Respiratory  [] CV  [] GI  []   [] Musculoskeletal  [] Skin/Breast  [] Neurological  [] Endocrine  [] Heme/Lymph  [] Allergic/Immunologic        MEDICATIONS:    Current Facility-Administered Medications:     fluPHENAZine HCl (PROLIXIN) tablet 5 mg, 5 mg, Oral, BID, Flora Warner MD, 5 mg at 10/22/17 0830    traZODone (DESYREL) tablet 50 mg, 50 mg, Oral, Nightly, Flora Warner MD, 50 mg at 10/21/17 2044    acetaminophen (TYLENOL) tablet 650 mg, 650 mg, Oral, Q4H PRN, Flora Warner MD    magnesium hydroxide (MILK OF MAGNESIA) 400 MG/5ML suspension 30 mL, 30 mL, Oral, Daily PRN, Flora Warner MD, 30 mL at 10/15/17 1119    haloperidol (HALDOL) tablet 5 mg, 5 mg, Oral, Q6H PRN, 5 mg at 10/14/17 0923 **OR** haloperidol lactate (HALDOL) injection 5 mg, 5 mg, Intramuscular, Q6H PRN, Flora Warner MD    hydrOXYzine (VISTARIL) capsule 50 mg, 50 mg, Oral, Q6H PRN, 50 mg at 10/22/17 0830 **OR** hydrOXYzine (VISTARIL) injection 50 mg, 50 mg, Intramuscular, Q6H PRN, Flora Warner MD      Examination:  /77   Pulse 65   Temp 98 °F (36.7 °C) (Oral)   Resp 16   Ht 5' 7\" (1.702 m)   Wt 115 lb (52.2 kg)   LMP  (LMP Unknown)   SpO2 97%   Breastfeeding? No   BMI 18.01 kg/m²   Gait - steady  Medication side effects(SE): no    Mental Status Examination:    Level of consciousness:  within normal limits   Appearance:  poor grooming and poor hygiene  Behavior/Motor:  psychomotor retardation  Attitude toward examiner:  cooperative  Speech:  slow   Mood: anxious, depressed  Affect:  flat  Thought processes:  slow   Thought content:  Suicidal Ideation:  denies suicidal ideation  Cognition:  oriented to person, place, and time   Concentration poor  Insight poor   Judgement fair     ASSESSMENT:   Patient symptoms are:  [] Well controlled  [] Improving  [] Worsening  [x] No change      Diagnosis:   Principal Problem:    [facility] ([facility]  Depressive disorder, NOS    LABS:    No results for input(s): WBC, HGB, PLT in the last 72 hours. No results for input(s): NA, K, CL, CO2, BUN, CREATININE, GLUCOSE in the last 72 hours. No results for input(s): BILITOT, ALKPHOS, AST, ALT in the last 72 hours.   Lab Results Component Value Date    [facility] POSITIVE 10/13/2017    BARBSCNU Neg 10/13/2017    LABBENZ POSITIVE 10/13/2017    OPIATESCREENURINE Neg 10/13/2017    PHENCYCLIDINESCREENURINE Neg 10/13/2017    ETOH <10 10/13/2017     Lab Results   Component Value Date    TSH 1.120 10/13/2017     No results found for: LITHIUM  No results found for: VALPROATE, CBMZ    Treatment Plan:  Reviewed current Medications with the patient. Will consider starting Remeron for depression/sleep/appetite  Recommended OG abilify- pt refusing   Risks, benefits, side effects, drug-to-drug interactions and alternatives to treatment were discussed. Collateral information  CD evaluation  Encourage patient to attend group and other milieu activities.   Discharge planning discussed with the patient and treatment team.    PSYCHOTHERAPY/COUNSELING:  [x] Therapeutic interview  [x] Supportive  [] CBT  [] Ongoing  [] Other    [x] Patient continues to need, on a daily basis, active treatment furnished directly by or requiring the supervision of inpatient psychiatric personnel                  Electronically signed by Kathyleen Mcardle, MD on 10/22/2017 at 5:07 PM

## 2017-10-23 VITALS
HEIGHT: 67 IN | RESPIRATION RATE: 18 BRPM | HEART RATE: 84 BPM | DIASTOLIC BLOOD PRESSURE: 92 MMHG | BODY MASS INDEX: 18.05 KG/M2 | WEIGHT: 115 LBS | OXYGEN SATURATION: 93 % | TEMPERATURE: 98 F | SYSTOLIC BLOOD PRESSURE: 119 MMHG

## 2017-10-23 PROCEDURE — 99239 HOSP IP/OBS DSCHRG MGMT >30: CPT | Performed by: PSYCHIATRY & NEUROLOGY

## 2017-10-23 PROCEDURE — 6370000000 HC RX 637 (ALT 250 FOR IP): Performed by: PSYCHIATRY & NEUROLOGY

## 2017-10-23 RX ORDER — FLUPHENAZINE HYDROCHLORIDE 5 MG/1
5 TABLET ORAL 2 TIMES DAILY
Qty: 60 TABLET | Refills: 1 | Status: SHIPPED | OUTPATIENT
Start: 2017-10-23

## 2017-10-23 RX ORDER — MIRTAZAPINE 15 MG/1
15 TABLET, FILM COATED ORAL NIGHTLY
Qty: 30 TABLET | Refills: 3 | Status: SHIPPED | OUTPATIENT
Start: 2017-10-23

## 2017-10-23 RX ORDER — TRAZODONE HYDROCHLORIDE 50 MG/1
50 TABLET ORAL NIGHTLY
Qty: 30 TABLET | Refills: 1 | Status: SHIPPED | OUTPATIENT
Start: 2017-10-23

## 2017-10-23 RX ADMIN — FLUPHENAZINE HYDROCHLORIDE 5 MG: 5 TABLET, FILM COATED ORAL at 08:58

## 2017-10-23 NOTE — PROGRESS NOTES
Pt reports eating her breakfast this morning, stated she doesn't like the supplements. Pt noted attending group. Denied all voices , depression, suicidal thoughts. Stated she is up at home taking care of her child, and doesn't lay around all day. Prosper Swain

## 2017-10-23 NOTE — DISCHARGE SUMMARY
Date    Depression     Headache        FAMILY/SOCIAL HISTORY:  No family history on file. Social History     Social History    Marital status: Single     Spouse name: N/A    Number of children: N/A    Years of education: N/A     Occupational History    Not on file. Social History Main Topics    Smoking status: Former Smoker    Smokeless tobacco: Not on file    Alcohol use No    Drug use: No    Sexual activity: Yes     Other Topics Concern    Not on file     Social History Narrative    No narrative on file       MEDICATIONS:    Current Facility-Administered Medications:     mirtazapine (REMERON SOL-TAB) disintegrating tablet 15 mg, 15 mg, Oral, Nightly, Esthela Stephens MD, 15 mg at 10/22/17 2257    fluPHENAZine HCl (PROLIXIN) tablet 5 mg, 5 mg, Oral, BID, Jackeline Rahman MD, 5 mg at 10/23/17 0858    traZODone (DESYREL) tablet 50 mg, 50 mg, Oral, Nightly, Jackeline Rahman MD, 50 mg at 10/22/17 2047    acetaminophen (TYLENOL) tablet 650 mg, 650 mg, Oral, Q4H PRN, Jackeline Rahman MD    magnesium hydroxide (MILK OF MAGNESIA) 400 MG/5ML suspension 30 mL, 30 mL, Oral, Daily PRN, Jackeline Rahman MD, 30 mL at 10/15/17 1119    haloperidol (HALDOL) tablet 5 mg, 5 mg, Oral, Q6H PRN, 5 mg at 10/14/17 0923 **OR** haloperidol lactate (HALDOL) injection 5 mg, 5 mg, Intramuscular, Q6H PRN, Jackeline Rahman MD    hydrOXYzine (VISTARIL) capsule 50 mg, 50 mg, Oral, Q6H PRN, 50 mg at 10/22/17 1917 **OR** hydrOXYzine (VISTARIL) injection 50 mg, 50 mg, Intramuscular, Q6H PRN, Jackeline Rahman MD    Examination:  BP (!) 119/92   Pulse 84   Temp 98 °F (36.7 °C)   Resp 18   Ht 5' 7\" (1.702 m)   Wt 115 lb (52.2 kg)   LMP  (LMP Unknown)   SpO2 93%   Breastfeeding?  No   BMI 18.01 kg/m²   Gait - steady    HOSPITAL COURSE[de-identified]  Following admission to the hospital, patient had a complete physical exam and blood work up  Patient was monitored closely with suicide precaution  Patient was started on prolixin and invega d/c  remeron started to improve her appetite and mood  Pt stays at home with her daughter and arturo  Pt report that she never neglected the child  Collateral from arturo and his mom - unssuccessful  S/W colleague wanted to  Inform CPS about the home situation and ensure safety of child at home  Was encouraged to participate in group and other milieu activity  Patient started to feel better with this combination of treatment. Significant progress in the symptoms since admission. Mood better, with the score of 2/10 - bad  No AVH or paranoid thoughts  No Hopeless or worthless feeling  No active SI/HI  Appetite:  [x] Normal  [] Increased  [] Decreased    Sleep:       [x] Normal  [] Fair       [] Poor            Energy:    [x] Normal  [] Increased  [] Decreased     SI [] Present  [x] Absent  HI  []Present  [x] Absent   Aggression:  [] yes  [] no  Patient is [x] able  [] unable to CONTRACT FOR SAFETY   Medication side effects(SE):  [x] None(Psych. Meds.) [] Other      Mental Status Examination on discharge:    Level of consciousness:  within normal limits   Appearance:  well-appearing  Behavior/Motor:  no abnormalities noted  Attitude toward examiner:  attentive and good eye contact  Speech:  spontaneous, normal rate and normal volume   Mood: euthymic  Affect:  flat  Thought processes:  slow   Thought content:  Delusions:  ideas of reference  Perceptual Disturbance:  denies any perceptual disturbance  Cognition:  oriented to person, place, and time   Concentration intact  Memory intact  Insight good   Judgement fair   Fund of Knowledge adequate      ASSESSMENT:  Patient symptoms are:  [x] Well controlled  [x] Improving  [] Worsening  [] No change      Diagnosis:  Principal Problem:    [facility]anoid schizophrenia ([facility]      LABS:    No results for input(s): WBC, HGB, PLT in the last 72 hours. No results for input(s): NA, K, CL, CO2, BUN, CREATININE, GLUCOSE in the last 72 hours.   No results for input(s):

## 2017-10-23 NOTE — PROGRESS NOTES
Pt. refused to attend the 1000 skills group, due to resting in room despite staff encouragement.  Electronically signed by Mirna Waggoner on 10/23/2017 at 11:05 AM

## 2017-10-23 NOTE — PLAN OF CARE
Problem: Altered Mood, Psychotic Behavior  Goal: STG-Able to demonstrate trust by eating, participating in treatment and following staff's directions  Outcome: Ongoing    Goal: LTG-Able to verbalize decrease in frequency and intensity of hallucinations  Outcome: Met This Shift    Goal: LTG-Able to verbalize reality based thinking  Outcome: Met This Shift    Goal: STG-Adequate nutritional intake  Outcome: Ongoing    Goal: LTG-Appropriate social interaction  Outcome: Ongoing    Goal: STG-Medication therapy compliance  Outcome: Met This Shift      Problem: Nutrition  Goal: Optimal nutrition therapy  Outcome: Ongoing

## 2017-10-23 NOTE — BH NOTE
Patient did not attend wrap up/relaxation group at 2010.  Electronically signed by Manju Almeida LPN on 06/44/6939 at 10:27 PM

## 2017-10-23 NOTE — PROGRESS NOTES
Affect and mood stable  Denies sucidial/homicidal ideation  Reviewed and verbalized understanding of all instructions  Belongings returned to patient  Patient d/c at this time with cab voucher for transport to [facility] purpose

## 2017-10-23 NOTE — PROGRESS NOTES
Pt remains isolative and non-social with peers, appears flat, reports poor sleep at night but says she takes naps during the day. Pt is vague about eating meals, states her appetite is \"ok\", she just doesn't like food here, reports using supplements. Pt denies all, discharge focused, denies ever having paranoia but appears paranoid, and guarded, evasive with assessment.

## 2017-10-23 NOTE — CARE COORDINATION
[address] [facility] Department of Children and Family Services   Contact Name:Devora  Contact #: [phone]    Case Number: [id number]    Placed call to above and reported case to [facility]. Gave patient's Name, , Address, Phone #, Name of significant other. [facility] reports they will investigate case and requests that [facility] call Hotline if patient is admitted again. Case Number: [id number]    Newton Dejesus

## 2023-04-03 PROBLEM — F20.0 PARANOID SCHIZOPHRENIA (MULTI): Status: ACTIVE | Noted: 2023-04-03

## 2023-04-03 PROBLEM — R87.810 ASCUS WITH POSITIVE HIGH RISK HPV CERVICAL: Status: ACTIVE | Noted: 2023-04-03

## 2023-04-03 PROBLEM — K59.00 CONSTIPATION: Status: ACTIVE | Noted: 2023-04-03

## 2023-04-03 PROBLEM — O23.40: Status: ACTIVE | Noted: 2023-04-03

## 2023-04-03 PROBLEM — O34.29 UTERINE SCAR FROM PREVIOUS SURGERY AFFECTING PREGNANCY (HHS-HCC): Status: ACTIVE | Noted: 2023-04-03

## 2023-04-03 PROBLEM — F41.9 ANXIETY DISORDER: Status: ACTIVE | Noted: 2023-04-03

## 2023-04-03 PROBLEM — F90.2 ATTENTION DEFICIT HYPERACTIVITY DISORDER (ADHD), COMBINED TYPE: Status: ACTIVE | Noted: 2023-04-03

## 2023-04-03 PROBLEM — F41.1 GENERALIZED ANXIETY DISORDER: Status: ACTIVE | Noted: 2023-04-03

## 2023-04-03 PROBLEM — L02.214 GROIN ABSCESS: Status: ACTIVE | Noted: 2023-04-03

## 2023-04-03 PROBLEM — R30.0: Status: ACTIVE | Noted: 2023-04-03

## 2023-04-03 PROBLEM — R56.9 SEIZURE (MULTI): Status: ACTIVE | Noted: 2023-04-03

## 2023-04-03 PROBLEM — O99.810 ABNORMAL GLUCOSE TOLERANCE IN PREGNANCY (HHS-HCC): Status: ACTIVE | Noted: 2023-04-03

## 2023-04-03 PROBLEM — O12.03: Status: ACTIVE | Noted: 2023-04-03

## 2023-04-03 PROBLEM — J34.89 NASAL PAIN: Status: ACTIVE | Noted: 2023-04-03

## 2023-04-03 PROBLEM — O99.340 DEPRESSION AFFECTING PREGNANCY, ANTEPARTUM (HHS-HCC): Status: ACTIVE | Noted: 2023-04-03

## 2023-04-03 PROBLEM — B97.7: Status: ACTIVE | Noted: 2023-04-03

## 2023-04-03 PROBLEM — F32.A DEPRESSION AFFECTING PREGNANCY, ANTEPARTUM (HHS-HCC): Status: ACTIVE | Noted: 2023-04-03

## 2023-04-03 PROBLEM — T14.8XXA BRUISING: Status: ACTIVE | Noted: 2023-04-03

## 2023-04-03 PROBLEM — R51.9 HEADACHE: Status: ACTIVE | Noted: 2023-04-03

## 2023-04-03 PROBLEM — G89.18 POSTOPERATIVE PAIN: Status: ACTIVE | Noted: 2023-04-03

## 2023-04-03 PROBLEM — G43.909 MIGRAINE: Status: ACTIVE | Noted: 2023-04-03

## 2023-04-03 PROBLEM — M79.89 RIGHT LEG SWELLING: Status: ACTIVE | Noted: 2023-04-03

## 2023-04-03 PROBLEM — O35.2XX0 HEREDITARY DISEASE IN FAMILY POSSIBLY AFFECTING FETUS (HHS-HCC): Status: ACTIVE | Noted: 2023-04-03

## 2023-04-03 PROBLEM — O09.299: Status: ACTIVE | Noted: 2023-04-03

## 2023-04-03 PROBLEM — O09.529 MULTIGRAVIDA OF ADVANCED MATERNAL AGE (HHS-HCC): Status: ACTIVE | Noted: 2023-04-03

## 2023-04-03 PROBLEM — O35.5XX0: Status: ACTIVE | Noted: 2023-04-03

## 2023-04-03 PROBLEM — L08.9 SKIN INFECTION: Status: ACTIVE | Noted: 2023-04-03

## 2023-04-03 PROBLEM — R87.610 ASCUS WITH POSITIVE HIGH RISK HPV CERVICAL: Status: ACTIVE | Noted: 2023-04-03

## 2023-04-03 PROBLEM — G47.00 INSOMNIA: Status: ACTIVE | Noted: 2023-04-03

## 2023-04-03 PROBLEM — F41.0 PANIC DISORDER: Status: ACTIVE | Noted: 2023-04-03

## 2023-04-03 PROBLEM — R21 SKIN RASH: Status: ACTIVE | Noted: 2023-04-03

## 2023-04-03 PROBLEM — O26.899: Status: ACTIVE | Noted: 2023-04-03

## 2023-04-03 PROBLEM — B37.31 YEAST VAGINITIS: Status: ACTIVE | Noted: 2023-04-03

## 2023-04-03 PROBLEM — O98.319: Status: ACTIVE | Noted: 2023-04-03

## 2023-04-03 PROBLEM — F33.9 EPISODE OF RECURRENT MAJOR DEPRESSIVE DISORDER (CMS-HCC): Status: ACTIVE | Noted: 2023-04-03

## 2023-04-03 RX ORDER — ZOLPIDEM TARTRATE 10 MG/1
TABLET ORAL
COMMUNITY
End: 2024-02-08 | Stop reason: WASHOUT

## 2023-04-03 RX ORDER — CEPHALEXIN 500 MG/1
CAPSULE ORAL EVERY 12 HOURS
COMMUNITY
Start: 2022-04-07 | End: 2023-11-29 | Stop reason: ALTCHOICE

## 2023-04-03 RX ORDER — BUTALBITAL, ACETAMINOPHEN AND CAFFEINE 300; 40; 50 MG/1; MG/1; MG/1
CAPSULE ORAL
COMMUNITY
Start: 2020-01-21 | End: 2024-02-20 | Stop reason: ALTCHOICE

## 2023-04-03 RX ORDER — CLONAZEPAM 0.5 MG/1
TABLET ORAL
COMMUNITY
Start: 2020-08-22

## 2023-04-03 RX ORDER — CABERGOLINE 0.5 MG/1
TABLET ORAL
COMMUNITY
Start: 2022-04-07 | End: 2024-02-20 | Stop reason: WASHOUT

## 2023-04-03 RX ORDER — HALOPERIDOL 2 MG/ML
SOLUTION ORAL
COMMUNITY

## 2023-05-01 ENCOUNTER — LAB (OUTPATIENT)
Dept: LAB | Facility: LAB | Age: 43
End: 2023-05-01
Payer: COMMERCIAL

## 2023-05-01 ENCOUNTER — OFFICE VISIT (OUTPATIENT)
Dept: PRIMARY CARE | Facility: CLINIC | Age: 43
End: 2023-05-01
Payer: COMMERCIAL

## 2023-05-01 VITALS
HEIGHT: 67 IN | SYSTOLIC BLOOD PRESSURE: 112 MMHG | HEART RATE: 110 BPM | DIASTOLIC BLOOD PRESSURE: 70 MMHG | RESPIRATION RATE: 16 BRPM | BODY MASS INDEX: 18.52 KG/M2 | TEMPERATURE: 98.4 F | WEIGHT: 118 LBS

## 2023-05-01 DIAGNOSIS — Z00.00 ENCOUNTER FOR PREVENTIVE HEALTH EXAMINATION: Primary | ICD-10-CM

## 2023-05-01 DIAGNOSIS — Z00.00 ENCOUNTER FOR PREVENTIVE HEALTH EXAMINATION: ICD-10-CM

## 2023-05-01 PROBLEM — B37.31 YEAST VAGINITIS: Status: RESOLVED | Noted: 2023-04-03 | Resolved: 2023-05-01

## 2023-05-01 PROBLEM — F32.A DEPRESSION AFFECTING PREGNANCY, ANTEPARTUM (HHS-HCC): Status: RESOLVED | Noted: 2023-04-03 | Resolved: 2023-05-01

## 2023-05-01 PROBLEM — O09.529 MULTIGRAVIDA OF ADVANCED MATERNAL AGE (HHS-HCC): Status: RESOLVED | Noted: 2023-04-03 | Resolved: 2023-05-01

## 2023-05-01 PROBLEM — O99.340 DEPRESSION AFFECTING PREGNANCY, ANTEPARTUM (HHS-HCC): Status: RESOLVED | Noted: 2023-04-03 | Resolved: 2023-05-01

## 2023-05-01 PROBLEM — O12.03: Status: RESOLVED | Noted: 2023-04-03 | Resolved: 2023-05-01

## 2023-05-01 PROBLEM — O34.29 UTERINE SCAR FROM PREVIOUS SURGERY AFFECTING PREGNANCY (HHS-HCC): Status: RESOLVED | Noted: 2023-04-03 | Resolved: 2023-05-01

## 2023-05-01 PROBLEM — R30.0: Status: RESOLVED | Noted: 2023-04-03 | Resolved: 2023-05-01

## 2023-05-01 PROBLEM — B97.7: Status: RESOLVED | Noted: 2023-04-03 | Resolved: 2023-05-01

## 2023-05-01 PROBLEM — J34.89 NASAL PAIN: Status: RESOLVED | Noted: 2023-04-03 | Resolved: 2023-05-01

## 2023-05-01 PROBLEM — R21 SKIN RASH: Status: RESOLVED | Noted: 2023-04-03 | Resolved: 2023-05-01

## 2023-05-01 PROBLEM — T14.8XXA BRUISING: Status: RESOLVED | Noted: 2023-04-03 | Resolved: 2023-05-01

## 2023-05-01 PROBLEM — R51.9 HEADACHE: Status: RESOLVED | Noted: 2023-04-03 | Resolved: 2023-05-01

## 2023-05-01 PROBLEM — O23.40: Status: RESOLVED | Noted: 2023-04-03 | Resolved: 2023-05-01

## 2023-05-01 PROBLEM — O09.299: Status: RESOLVED | Noted: 2023-04-03 | Resolved: 2023-05-01

## 2023-05-01 PROBLEM — L08.9 SKIN INFECTION: Status: RESOLVED | Noted: 2023-04-03 | Resolved: 2023-05-01

## 2023-05-01 PROBLEM — K59.00 CONSTIPATION: Status: RESOLVED | Noted: 2023-04-03 | Resolved: 2023-05-01

## 2023-05-01 PROBLEM — O26.899: Status: RESOLVED | Noted: 2023-04-03 | Resolved: 2023-05-01

## 2023-05-01 PROBLEM — G89.18 POSTOPERATIVE PAIN: Status: RESOLVED | Noted: 2023-04-03 | Resolved: 2023-05-01

## 2023-05-01 PROBLEM — O99.810 ABNORMAL GLUCOSE TOLERANCE IN PREGNANCY (HHS-HCC): Status: RESOLVED | Noted: 2023-04-03 | Resolved: 2023-05-01

## 2023-05-01 PROBLEM — O98.319: Status: RESOLVED | Noted: 2023-04-03 | Resolved: 2023-05-01

## 2023-05-01 PROBLEM — M79.89 RIGHT LEG SWELLING: Status: RESOLVED | Noted: 2023-04-03 | Resolved: 2023-05-01

## 2023-05-01 PROBLEM — F41.9 ANXIETY DISORDER: Status: RESOLVED | Noted: 2023-04-03 | Resolved: 2023-05-01

## 2023-05-01 PROBLEM — L02.214 GROIN ABSCESS: Status: RESOLVED | Noted: 2023-04-03 | Resolved: 2023-05-01

## 2023-05-01 PROCEDURE — 85027 COMPLETE CBC AUTOMATED: CPT

## 2023-05-01 PROCEDURE — 36415 COLL VENOUS BLD VENIPUNCTURE: CPT

## 2023-05-01 PROCEDURE — 82306 VITAMIN D 25 HYDROXY: CPT

## 2023-05-01 PROCEDURE — 84439 ASSAY OF FREE THYROXINE: CPT

## 2023-05-01 PROCEDURE — 99396 PREV VISIT EST AGE 40-64: CPT | Performed by: FAMILY MEDICINE

## 2023-05-01 PROCEDURE — 84443 ASSAY THYROID STIM HORMONE: CPT

## 2023-05-01 PROCEDURE — 83036 HEMOGLOBIN GLYCOSYLATED A1C: CPT

## 2023-05-01 PROCEDURE — 80061 LIPID PANEL: CPT

## 2023-05-01 PROCEDURE — 80053 COMPREHEN METABOLIC PANEL: CPT

## 2023-05-01 PROCEDURE — 1036F TOBACCO NON-USER: CPT | Performed by: FAMILY MEDICINE

## 2023-05-01 RX ORDER — LITHIUM CARBONATE 450 MG/1
TABLET ORAL
COMMUNITY
Start: 2023-02-14 | End: 2023-11-29 | Stop reason: ALTCHOICE

## 2023-05-01 RX ORDER — LORAZEPAM 1 MG/1
TABLET ORAL
COMMUNITY
Start: 2022-05-26 | End: 2024-02-08 | Stop reason: WASHOUT

## 2023-05-01 RX ORDER — PRAZOSIN HYDROCHLORIDE 1 MG/1
1 CAPSULE ORAL NIGHTLY
COMMUNITY
Start: 2022-11-10 | End: 2023-11-29 | Stop reason: ALTCHOICE

## 2023-05-01 RX ORDER — MULTIVITAMIN
1 TABLET ORAL DAILY
Qty: 30 TABLET | Refills: 11 | Status: SHIPPED | OUTPATIENT
Start: 2023-05-01 | End: 2023-05-01 | Stop reason: SDUPTHER

## 2023-05-01 RX ORDER — PROMETHAZINE HYDROCHLORIDE 12.5 MG/1
TABLET ORAL
COMMUNITY
Start: 2023-03-10 | End: 2024-02-20 | Stop reason: WASHOUT

## 2023-05-01 RX ORDER — TRAZODONE HYDROCHLORIDE 50 MG/1
50 TABLET ORAL NIGHTLY
COMMUNITY
Start: 2023-04-09 | End: 2023-11-29 | Stop reason: ALTCHOICE

## 2023-05-01 RX ORDER — METOPROLOL SUCCINATE 25 MG/1
TABLET, EXTENDED RELEASE ORAL
COMMUNITY
Start: 2023-03-10 | End: 2023-11-29 | Stop reason: ALTCHOICE

## 2023-05-01 RX ORDER — MULTIVITAMIN
1 TABLET ORAL DAILY
Qty: 30 TABLET | Refills: 11 | Status: SHIPPED | OUTPATIENT
Start: 2023-05-01 | End: 2023-11-29 | Stop reason: ALTCHOICE

## 2023-05-01 NOTE — PROGRESS NOTES
Jazmin Bowman is a 42 y.o. female here today for   Chief Complaint   Patient presents with    Annual Exam   Pt has appt this week with OBGYN for pap and mammogram      Right ear pain and possible blood for a few weeks.  Worried there may be a bug in her ear.  Gets a small drop of blood when she cleans daily with Qtip.       She is worried about possible DM.  Had gestational DM when pregnant.  Has noticed some feelings of low glucose and has to eat something to feel better.      Anxiety has been bad.  On medications for this.  She sees a psychiatrist for this.  Medicines do help.  Takes lorazepam daily.      She denies tobacco or vape use.  She does not drink any alcohol.    Objective    Visit Vitals  /70   Pulse 110   Temp 36.9 °C (98.4 °F)   Resp 16     Body mass index is 18.48 kg/m².     Physical Exam  Vitals and nursing note reviewed.   Constitutional:       General: She is not in acute distress.     Appearance: Normal appearance.   HENT:      Head: Normocephalic and atraumatic.      Right Ear: Tympanic membrane, ear canal and external ear normal.      Left Ear: Tympanic membrane, ear canal and external ear normal.      Nose: Nose normal.      Mouth/Throat:      Mouth: Mucous membranes are moist.      Pharynx: Oropharynx is clear.   Eyes:      Extraocular Movements: Extraocular movements intact.      Conjunctiva/sclera: Conjunctivae normal.      Pupils: Pupils are equal, round, and reactive to light.   Cardiovascular:      Rate and Rhythm: Normal rate and regular rhythm.      Pulses: Normal pulses.      Heart sounds: Normal heart sounds. No murmur heard.     No friction rub. No gallop.   Pulmonary:      Effort: Pulmonary effort is normal. No respiratory distress.      Breath sounds: Normal breath sounds.   Abdominal:      General: Abdomen is flat. Bowel sounds are normal. There is no distension.      Palpations: Abdomen is soft.      Tenderness: There is no abdominal tenderness.   Musculoskeletal:          General: Normal range of motion.      Cervical back: Normal range of motion and neck supple.   Lymphadenopathy:      Cervical: No cervical adenopathy.   Skin:     General: Skin is warm and dry.      Findings: No lesion or rash.   Neurological:      General: No focal deficit present.      Mental Status: She is alert. Mental status is at baseline.   Psychiatric:         Mood and Affect: Mood normal.         Behavior: Behavior normal.         Thought Content: Thought content normal.         Judgment: Judgment normal.        Right ear canal with a very small area of irritation superiorly.  No signs of an infection at all.    Assessment    1. Encounter for preventive health examination  Comprehensive Metabolic Panel, CBC, Lipid Panel, TSH with reflex to Free T4 if abnormal, Vitamin D, Total, Hemoglobin A1C, multivitamin tablet, DISCONTINUED: multivitamin tablet   Recommend regular exercise, balanced diet, regular dental exams, and healthy habits.  Appropriate labs ordered or reviewed.  She will see her gynecologist soon for routine exam.  I recommend that she avoid using Q-tips and I think the irritation in her canal on the right side will heal spontaneously.  Antibiotics are not warranted.  She asks if I can prescribe a multivitamin which I did.  I described the symptoms of diabetes and told her it is very unlikely that she has diabetes if her sugars may be low at times.  We will check labs including an A1c to be certain.   I recommend a yearly flu shot in the fall and I recommend a yearly wellness exam.

## 2023-05-02 LAB
ALANINE AMINOTRANSFERASE (SGPT) (U/L) IN SER/PLAS: 12 U/L (ref 7–45)
ALBUMIN (G/DL) IN SER/PLAS: 4.7 G/DL (ref 3.4–5)
ALKALINE PHOSPHATASE (U/L) IN SER/PLAS: 63 U/L (ref 33–110)
ANION GAP IN SER/PLAS: 12 MMOL/L (ref 10–20)
ASPARTATE AMINOTRANSFERASE (SGOT) (U/L) IN SER/PLAS: 14 U/L (ref 9–39)
BILIRUBIN TOTAL (MG/DL) IN SER/PLAS: 0.4 MG/DL (ref 0–1.2)
CALCIDIOL (25 OH VITAMIN D3) (NG/ML) IN SER/PLAS: 32 NG/ML
CALCIUM (MG/DL) IN SER/PLAS: 9.9 MG/DL (ref 8.6–10.6)
CARBON DIOXIDE, TOTAL (MMOL/L) IN SER/PLAS: 28 MMOL/L (ref 21–32)
CHLORIDE (MMOL/L) IN SER/PLAS: 102 MMOL/L (ref 98–107)
CHOLESTEROL (MG/DL) IN SER/PLAS: 195 MG/DL (ref 0–199)
CHOLESTEROL IN HDL (MG/DL) IN SER/PLAS: 74.1 MG/DL
CHOLESTEROL/HDL RATIO: 2.6
CREATININE (MG/DL) IN SER/PLAS: 0.54 MG/DL (ref 0.5–1.05)
ERYTHROCYTE DISTRIBUTION WIDTH (RATIO) BY AUTOMATED COUNT: 11.9 % (ref 11.5–14.5)
ERYTHROCYTE MEAN CORPUSCULAR HEMOGLOBIN CONCENTRATION (G/DL) BY AUTOMATED: 31.4 G/DL (ref 32–36)
ERYTHROCYTE MEAN CORPUSCULAR VOLUME (FL) BY AUTOMATED COUNT: 94 FL (ref 80–100)
ERYTHROCYTES (10*6/UL) IN BLOOD BY AUTOMATED COUNT: 4.35 X10E12/L (ref 4–5.2)
ESTIMATED AVERAGE GLUCOSE FOR HBA1C: 85 MG/DL
GFR FEMALE: >90 ML/MIN/1.73M2
GLUCOSE (MG/DL) IN SER/PLAS: 93 MG/DL (ref 74–99)
HEMATOCRIT (%) IN BLOOD BY AUTOMATED COUNT: 40.8 % (ref 36–46)
HEMOGLOBIN (G/DL) IN BLOOD: 12.8 G/DL (ref 12–16)
HEMOGLOBIN A1C/HEMOGLOBIN TOTAL IN BLOOD: 4.6 %
LDL: 108 MG/DL (ref 0–99)
LEUKOCYTES (10*3/UL) IN BLOOD BY AUTOMATED COUNT: 7.8 X10E9/L (ref 4.4–11.3)
NRBC (PER 100 WBCS) BY AUTOMATED COUNT: 0 /100 WBC (ref 0–0)
PLATELETS (10*3/UL) IN BLOOD AUTOMATED COUNT: 294 X10E9/L (ref 150–450)
POTASSIUM (MMOL/L) IN SER/PLAS: 4.4 MMOL/L (ref 3.5–5.3)
PROTEIN TOTAL: 7.2 G/DL (ref 6.4–8.2)
SODIUM (MMOL/L) IN SER/PLAS: 138 MMOL/L (ref 136–145)
THYROTROPIN (MIU/L) IN SER/PLAS BY DETECTION LIMIT <= 0.05 MIU/L: 0.42 MIU/L (ref 0.44–3.98)
THYROXINE (T4) FREE (NG/DL) IN SER/PLAS: 1.25 NG/DL (ref 0.78–1.48)
TRIGLYCERIDE (MG/DL) IN SER/PLAS: 67 MG/DL (ref 0–149)
UREA NITROGEN (MG/DL) IN SER/PLAS: 6 MG/DL (ref 6–23)
VLDL: 13 MG/DL (ref 0–40)

## 2023-05-03 ENCOUNTER — TELEPHONE (OUTPATIENT)
Dept: PRIMARY CARE | Facility: CLINIC | Age: 43
End: 2023-05-03
Payer: COMMERCIAL

## 2023-05-03 NOTE — TELEPHONE ENCOUNTER
----- Message from Daljit Berrios MD sent at 5/3/2023  8:36 AM EDT -----  Please inform the patient that her labs were all essentially normal.  Her glucose and A1c were completely normal so she definitely does not have diabetes.  Except one of her thyroid tests indicated that she may have possible high thyroid issues or hyperthyroidism.  But the confirmatory lab done to check her thyroid was normal.  I would like to do some further labs to check her thyroid function but I want her to wait 1 month to get these labs.  If her thyroid function is slightly high then it is not immediately dangerous in any way so waiting 1 month is what we usually do.  Please order a TSH, free T4, free T3 to be drawn on Marjorie 3.  We will call her with results and follow-up accordingly.

## 2023-05-03 NOTE — PROGRESS NOTES
Jazmin Bowman is a 42 y.o. female here today for No chief complaint on file.       HPI       Objective    There were no vitals taken for this visit.  There is no height or weight on file to calculate BMI.     Physical Exam       Assessment    No diagnosis found.

## 2023-05-04 ENCOUNTER — TELEPHONE (OUTPATIENT)
Dept: PRIMARY CARE | Facility: CLINIC | Age: 43
End: 2023-05-04
Payer: COMMERCIAL

## 2023-05-04 NOTE — TELEPHONE ENCOUNTER
Patient informed of results. He verbalized understanding and all questions and concerns answered at this time.

## 2023-05-05 LAB
HEPATITIS B VIRUS SURFACE AG PRESENCE IN SERUM: NONREACTIVE
HEPATITIS C VIRUS AB PRESENCE IN SERUM: NONREACTIVE
HIV 1/ 2 AG/AB SCREEN: NONREACTIVE
SYPHILIS TOTAL AB: NONREACTIVE

## 2023-05-10 LAB
CHLAMYDIA TRACH., AMPLIFIED: NEGATIVE
N. GONORRHEA, AMPLIFIED: NEGATIVE
TRICHOMONAS VAGINALIS: NEGATIVE

## 2023-05-11 ENCOUNTER — TELEPHONE (OUTPATIENT)
Dept: PRIMARY CARE | Facility: CLINIC | Age: 43
End: 2023-05-11
Payer: COMMERCIAL

## 2023-05-11 NOTE — LETTER
May 11, 2023     Jazmin Bowman  [address]  [place] [address]    Patient: Jazmin Bowman   YOB: 1980   Date of Visit: 5/11/2023       Dear Dr. Jazmin Bowman:    We have been trying to contact you regarding your results. If you can please contact our office at [phone] and we can review your labs with you. Thank you.        Sincerely,     Terra Salgado LPN      CC: No Recipients  ______________________________________________________________________________________

## 2023-10-05 ENCOUNTER — APPOINTMENT (OUTPATIENT)
Dept: OBSTETRICS AND GYNECOLOGY | Facility: CLINIC | Age: 43
End: 2023-10-05
Payer: COMMERCIAL

## 2023-11-29 ENCOUNTER — OFFICE VISIT (OUTPATIENT)
Dept: PRIMARY CARE | Facility: CLINIC | Age: 43
End: 2023-11-29
Payer: COMMERCIAL

## 2023-11-29 VITALS
BODY MASS INDEX: 19.3 KG/M2 | WEIGHT: 123 LBS | SYSTOLIC BLOOD PRESSURE: 112 MMHG | DIASTOLIC BLOOD PRESSURE: 72 MMHG | HEART RATE: 110 BPM | RESPIRATION RATE: 20 BRPM | TEMPERATURE: 98 F | HEIGHT: 67 IN

## 2023-11-29 DIAGNOSIS — G43.909 MIGRAINE WITHOUT STATUS MIGRAINOSUS, NOT INTRACTABLE, UNSPECIFIED MIGRAINE TYPE: Primary | ICD-10-CM

## 2023-11-29 DIAGNOSIS — R79.89 ABNORMAL TSH: ICD-10-CM

## 2023-11-29 PROBLEM — T50.905A EXTRAPYRAMIDAL MOVEMENT DISORDER, DRUG-INDUCED: Status: ACTIVE | Noted: 2023-03-14

## 2023-11-29 PROBLEM — G25.89 EXTRAPYRAMIDAL MOVEMENT DISORDER, DRUG-INDUCED: Status: ACTIVE | Noted: 2023-03-14

## 2023-11-29 PROBLEM — R63.6 UNDERWEIGHT: Status: ACTIVE | Noted: 2023-03-10

## 2023-11-29 PROCEDURE — 1036F TOBACCO NON-USER: CPT | Performed by: FAMILY MEDICINE

## 2023-11-29 PROCEDURE — 99214 OFFICE O/P EST MOD 30 MIN: CPT | Performed by: FAMILY MEDICINE

## 2023-11-29 RX ORDER — ARIPIPRAZOLE 400 MG
KIT INTRAMUSCULAR
COMMUNITY
Start: 2023-10-19

## 2023-11-29 RX ORDER — BENZTROPINE MESYLATE 1 MG/1
1 TABLET ORAL 2 TIMES DAILY PRN
COMMUNITY
Start: 2023-10-12

## 2023-11-29 RX ORDER — OLANZAPINE AND SAMIDORPHAN L-MALATE 10; 10 MG/1; MG/1
1 TABLET, FILM COATED ORAL NIGHTLY
COMMUNITY
Start: 2023-06-30 | End: 2023-11-29 | Stop reason: ALTCHOICE

## 2023-11-29 RX ORDER — ARIPIPRAZOLE 10 MG/1
TABLET ORAL
COMMUNITY
Start: 2023-02-14 | End: 2023-11-29 | Stop reason: ALTCHOICE

## 2023-11-29 RX ORDER — HALOPERIDOL DECANOATE 100 MG/ML
INJECTION INTRAMUSCULAR
COMMUNITY
Start: 2023-10-26

## 2023-11-29 RX ORDER — MIRTAZAPINE 7.5 MG/1
TABLET, FILM COATED ORAL
COMMUNITY
Start: 2023-03-10 | End: 2023-11-29 | Stop reason: ALTCHOICE

## 2023-11-29 RX ORDER — HALOPERIDOL 5 MG/1
5 TABLET ORAL NIGHTLY
COMMUNITY
Start: 2023-06-01 | End: 2023-11-29 | Stop reason: ALTCHOICE

## 2023-11-29 RX ORDER — QUETIAPINE FUMARATE 100 MG/1
200 TABLET, FILM COATED ORAL NIGHTLY
COMMUNITY
Start: 2023-08-28 | End: 2023-11-29 | Stop reason: ALTCHOICE

## 2023-11-29 RX ORDER — PRENATAL VIT NO.179/IRON/FOLIC 28MG-0.8MG
1 TABLET ORAL DAILY
COMMUNITY
Start: 2023-05-04 | End: 2024-02-20 | Stop reason: WASHOUT

## 2023-11-29 RX ORDER — RISPERIDONE 1 MG/1
1 TABLET ORAL NIGHTLY
COMMUNITY
Start: 2023-08-17 | End: 2023-11-29 | Stop reason: ALTCHOICE

## 2023-11-29 RX ORDER — HYDROXYZINE HYDROCHLORIDE 50 MG/1
50 TABLET, FILM COATED ORAL 3 TIMES DAILY PRN
COMMUNITY
Start: 2023-10-26

## 2023-11-29 RX ORDER — ONDANSETRON 4 MG/1
4 TABLET, FILM COATED ORAL EVERY 8 HOURS PRN
Qty: 30 TABLET | Refills: 3 | Status: SHIPPED | OUTPATIENT
Start: 2023-11-29

## 2023-11-29 ASSESSMENT — ENCOUNTER SYMPTOMS
DIZZINESS: 1
NAUSEA: 1
HEADACHES: 1
BLURRED VISION: 0
LOSS OF BALANCE: 1

## 2023-11-29 NOTE — PROGRESS NOTES
Jazmin Bowman is a 43 y.o. female here today for   Chief Complaint   Patient presents with    Headache      Review lab results from May.  TSH was low at 0.42.      Headache   This is a recurrent problem. The current episode started more than 1 month ago. Associated symptoms include dizziness, a loss of balance and nausea. Pertinent negatives include no blurred vision.      Getting migraines since teen years.  Used to take Fioricet or Phenergan.  She says she gets migraine HA's about 2 times per week with nausea.  She has been taking OTC meds b/c she has been out of Fioricet since 2 years ago.  She says she had a bad reaction to imitrex - numb and could not move.  She specifically asked for Fioricet on multiple occasions.  At first she said it was prescribed by her previous PCP Dr. Simmons at and then she said a neurologist had prescribed it and then she said her gynecologist had prescribed it.  I reviewed her [place] report and it shows no prescriptions for Fioricet for at least 2 years.  She is on multiple other controlled substances.  She does get nausea with these headaches and when she is anxious.  She asked for refill of nausea medications to use as needed.  She says she does see her psychiatrist frequently and she also sees a neurologist for seizures.  She says she has not really discussed the headaches with her neurologist.  Her headaches have not changed in type or intensity over many years of time.  She denies any other associated neurological deficits on review.    We had done labs previously and her screening TSH was slightly abnormal.  My staff called and left multiple messages but she says that she never got back to us and she does not register for on line notifications because she does not trust those type of systems.      Current Outpatient Medications:     Abilify Maintena 400 mg injection, inject 1 DOSE intramuscularly EVERY 4 WEEKS, Disp: , Rfl:     benztropine (Cogentin) 1 mg tablet, Take 1 tablet (1  mg) by mouth 2 times a day as needed., Disp: , Rfl:     butalbital-acetaminophen-caff (Fioricet) -40 mg capsule, Take by mouth., Disp: , Rfl:     cabergoline (Dostinex) 0.5 mg tablet, Take by mouth., Disp: , Rfl:     clonazePAM (KlonoPIN) 0.5 mg tablet, Take by mouth., Disp: , Rfl:     haloperidol (Haldol) 2 mg/mL solution, Take by mouth., Disp: , Rfl:     haloperidol decanoate (Haldol Decanoate) 100 mg/mL injection, inject 1 AND 1/2 milliliter intramuscularly every 4 weeks, Disp: , Rfl:     hydrOXYzine HCL (Atarax) 50 mg tablet, Take 1 tablet (50 mg) by mouth 3 times a day as needed., Disp: , Rfl:     LORazepam (Ativan) 1 mg tablet, take 1 tablet by mouth 4 times a day, discontinue clonazepam, Disp: , Rfl:     prenatal vit no.179-iron-folic 28 mg iron- 800 mcg tablet, Take 1 tablet by mouth once daily. as directed, Disp: , Rfl:     promethazine (Phenergan) 12.5 mg tablet, , Disp: , Rfl:     zolpidem (Ambien) 10 mg tablet, Take by mouth., Disp: , Rfl:     ondansetron (Zofran) 4 mg tablet, Take 1 tablet (4 mg) by mouth every 8 hours if needed for nausea or vomiting., Disp: 30 tablet, Rfl: 3    rimegepant (Nurtec ODT) 75 mg tablet,disintegrating, Take 1 tablet (75 mg) by mouth every other day., Disp: 15 tablet, Rfl: 1    Patient Active Problem List   Diagnosis    ASCUS with positive high risk HPV cervical    Attention deficit hyperactivity disorder (ADHD), combined type    Episode of recurrent major depressive disorder (CMS/HCC)    Hereditary disease in family possibly affecting fetus    Panic disorder    Generalized anxiety disorder    Migraine    Maternal care for (suspected) damage to fetus by drugs    Insomnia    Paranoid schizophrenia (CMS/HCC)    Seizure (CMS/HCC)    Encounter for preventive health examination    Underweight    Extrapyramidal movement disorder, drug-induced    Elevated prolactin level    Abnormal TSH         No results found for this or any previous visit (from the past 672 hour(s)).  "    Objective    Visit Vitals  /72   Pulse 110   Temp 36.7 °C (98 °F)   Resp 20   Ht 1.702 m (5' 7\")   Wt 55.8 kg (123 lb)   BMI 19.26 kg/m²     Body mass index is 19.26 kg/m².     Physical Exam   General - Not in acute distress and cooperative.  Build & Nutrition - Well developed  Posture - Normal  Gait - Normal  Mental Status - alert and oriented x 3    Head - Normocephalic    Eyes - Bilateral - Sclera clear and lids pink without edema or mass.      Skin - Warm and dry with no rashes on visible skin    Neuropsychiatric - normal mood and affect, well groomed and good eye contact.  Able to articulate well with normal speech/language, rate and coherence.  Associations are intact.  No evidence of hallucinations, delusions, obsessions or homicidal/suicidal ideation.  Attention span and ability to concentrate are normal.    Assessment    1. Migraine without status migrainosus, not intractable, unspecified migraine type  rimegepant (Nurtec ODT) 75 mg tablet,disintegrating, ondansetron (Zofran) 4 mg tablet, Referral to Neurology   The patient has a complex history with psychiatric illness and seizure disorder in addition to migraines.  I told her that I would not prescribe Fioricet because I do not think it is safe with her other medications.  We discussed options and we are going to try Nurtec as needed.  I will refill ondansetron for as needed use for nausea.  I will refer her to neurology for management of her complex migraines.     2. Abnormal TSH  Free T4 Index, Thyroid Stimulating Hormone, Triiodothyronine, Free   We reviewed her labs and I am going to order additional thyroid evaluation to see if her thyroid is out of balance.  We will call her with results.         "

## 2023-11-30 ENCOUNTER — TELEPHONE (OUTPATIENT)
Dept: PRIMARY CARE | Facility: CLINIC | Age: 43
End: 2023-11-30

## 2023-11-30 ENCOUNTER — OFFICE VISIT (OUTPATIENT)
Dept: OBSTETRICS AND GYNECOLOGY | Facility: CLINIC | Age: 43
End: 2023-11-30
Payer: COMMERCIAL

## 2023-11-30 VITALS
DIASTOLIC BLOOD PRESSURE: 80 MMHG | BODY MASS INDEX: 19.09 KG/M2 | WEIGHT: 121.6 LBS | HEIGHT: 67 IN | SYSTOLIC BLOOD PRESSURE: 132 MMHG

## 2023-11-30 DIAGNOSIS — Z32.01 POSITIVE URINE PREGNANCY TEST (HHS-HCC): ICD-10-CM

## 2023-11-30 DIAGNOSIS — N92.6 MISSED MENSES: Primary | ICD-10-CM

## 2023-11-30 DIAGNOSIS — O36.80X0 PREGNANCY WITH UNCERTAIN FETAL VIABILITY, SINGLE OR UNSPECIFIED FETUS (HHS-HCC): ICD-10-CM

## 2023-11-30 DIAGNOSIS — O36.80X0 PREGNANCY OF UNKNOWN ANATOMIC LOCATION (HHS-HCC): ICD-10-CM

## 2023-11-30 LAB
ABO GROUP (TYPE) IN BLOOD: NORMAL
ANTIBODY SCREEN: NORMAL
B-HCG SERPL-ACNC: <2 MIU/ML
PREGNANCY TEST URINE, POC: POSITIVE
RH FACTOR (ANTIGEN D): NORMAL

## 2023-11-30 PROCEDURE — 81025 URINE PREGNANCY TEST: CPT | Performed by: ADVANCED PRACTICE MIDWIFE

## 2023-11-30 PROCEDURE — 36415 COLL VENOUS BLD VENIPUNCTURE: CPT

## 2023-11-30 PROCEDURE — 1036F TOBACCO NON-USER: CPT | Performed by: ADVANCED PRACTICE MIDWIFE

## 2023-11-30 PROCEDURE — 86850 RBC ANTIBODY SCREEN: CPT

## 2023-11-30 PROCEDURE — 99214 OFFICE O/P EST MOD 30 MIN: CPT | Performed by: ADVANCED PRACTICE MIDWIFE

## 2023-11-30 PROCEDURE — 86900 BLOOD TYPING SEROLOGIC ABO: CPT

## 2023-11-30 PROCEDURE — 84702 CHORIONIC GONADOTROPIN TEST: CPT

## 2023-11-30 PROCEDURE — 86901 BLOOD TYPING SEROLOGIC RH(D): CPT

## 2023-11-30 NOTE — TELEPHONE ENCOUNTER
Pt walked in , her therapist who usually gives her the injection of haldol doesn't have the needles to give it, so they said to come to your pcp office to get it with the nurse clinic.  She gets the vial from the pharmacy which she usually takes to the therapists office and gets it there.  Is it ok if she brings the vial here and we give her her injection?  She is Due for injection 12/7/2023    Please call the patient to advise and set time for nurse visit if Dr Berrios approves

## 2023-11-30 NOTE — PROGRESS NOTES
"Jazmin Bowman  Is a 43 y.o.  who persents with c/o + UPT at home and feeling fetal movement. Periods regular but cannot remember when last one was. Did not have a period last month and started to  have spotting yesterday. No cramping or heavy bleeding. This was not a planned pregnancy but pt plans on continuing pregnancy if viable.     LMP: unknown, but far along.   Provider is in the [facility] building, sees Marilee Bowers or Dr Sauceda.     /80   Ht 1.702 m (5' 7\")   Wt 55.2 kg (121 lb 9.6 oz)   LMP  (LMP Unknown)   Breastfeeding Unknown   BMI 19.05 kg/m²     + UPT in office today  TVUS reveals thin endometrial strip, no evidence of gestational sac.     Jazmin was seen today for pregnancy problem.  Diagnoses and all orders for this visit:  Missed menses (Primary)  -     POC pregnancy, urine  -     US MAC OB imaging order; Future  -     hCG, quantitative  -     Type And Screen  Positive urine pregnancy test  -     US MAC OB imaging order; Future  Pregnancy of unknown anatomic location  -     US MAC OB imaging order; Future    TRAY Bernal-JERSON     "

## 2023-12-07 ENCOUNTER — ANCILLARY PROCEDURE (OUTPATIENT)
Dept: RADIOLOGY | Facility: CLINIC | Age: 43
End: 2023-12-07
Payer: COMMERCIAL

## 2023-12-07 DIAGNOSIS — N92.6 MISSED MENSES: ICD-10-CM

## 2023-12-07 DIAGNOSIS — O36.80X0 PREGNANCY OF UNKNOWN ANATOMIC LOCATION (HHS-HCC): ICD-10-CM

## 2023-12-07 DIAGNOSIS — Z32.01 POSITIVE URINE PREGNANCY TEST (HHS-HCC): ICD-10-CM

## 2023-12-07 PROCEDURE — 76856 US EXAM PELVIC COMPLETE: CPT

## 2023-12-07 PROCEDURE — 76801 OB US < 14 WKS SINGLE FETUS: CPT

## 2023-12-07 PROCEDURE — 76815 OB US LIMITED FETUS(S): CPT | Performed by: OBSTETRICS & GYNECOLOGY

## 2023-12-07 PROCEDURE — 76817 TRANSVAGINAL US OBSTETRIC: CPT | Performed by: OBSTETRICS & GYNECOLOGY

## 2024-02-08 ENCOUNTER — OFFICE VISIT (OUTPATIENT)
Dept: OBSTETRICS AND GYNECOLOGY | Facility: CLINIC | Age: 44
End: 2024-02-08
Payer: COMMERCIAL

## 2024-02-08 DIAGNOSIS — O03.4 RETAINED PRODUCTS OF CONCEPTION AFTER MISCARRIAGE (HHS-HCC): ICD-10-CM

## 2024-02-08 DIAGNOSIS — R21 RASH: ICD-10-CM

## 2024-02-08 DIAGNOSIS — N92.6 MISSED MENSES: Primary | ICD-10-CM

## 2024-02-08 DIAGNOSIS — Z01.419 WELL WOMAN EXAM: ICD-10-CM

## 2024-02-08 DIAGNOSIS — Z20.2 POSSIBLE EXPOSURE TO STD: ICD-10-CM

## 2024-02-08 DIAGNOSIS — K59.00 CONSTIPATION, UNSPECIFIED CONSTIPATION TYPE: ICD-10-CM

## 2024-02-08 DIAGNOSIS — R14.0 ABDOMINAL DISTENSION: ICD-10-CM

## 2024-02-08 LAB
B-HCG SERPL-ACNC: <2 MIU/ML
HBV SURFACE AG SERPL QL IA: NONREACTIVE
HCV AB SER QL: NONREACTIVE
HIV 1+2 AB+HIV1 P24 AG SERPL QL IA: NONREACTIVE
PREGNANCY TEST URINE, POC: NEGATIVE

## 2024-02-08 PROCEDURE — 86780 TREPONEMA PALLIDUM: CPT

## 2024-02-08 PROCEDURE — 87389 HIV-1 AG W/HIV-1&-2 AB AG IA: CPT

## 2024-02-08 PROCEDURE — 87340 HEPATITIS B SURFACE AG IA: CPT

## 2024-02-08 PROCEDURE — 86803 HEPATITIS C AB TEST: CPT

## 2024-02-08 PROCEDURE — 1036F TOBACCO NON-USER: CPT | Performed by: ADVANCED PRACTICE MIDWIFE

## 2024-02-08 PROCEDURE — 99214 OFFICE O/P EST MOD 30 MIN: CPT | Performed by: ADVANCED PRACTICE MIDWIFE

## 2024-02-08 PROCEDURE — 36415 COLL VENOUS BLD VENIPUNCTURE: CPT

## 2024-02-08 PROCEDURE — 87624 HPV HI-RISK TYP POOLED RSLT: CPT

## 2024-02-08 PROCEDURE — 87800 DETECT AGNT MULT DNA DIREC: CPT

## 2024-02-08 PROCEDURE — 87661 TRICHOMONAS VAGINALIS AMPLIF: CPT

## 2024-02-08 PROCEDURE — 81025 URINE PREGNANCY TEST: CPT | Performed by: ADVANCED PRACTICE MIDWIFE

## 2024-02-08 PROCEDURE — 88175 CYTOPATH C/V AUTO FLUID REDO: CPT

## 2024-02-08 PROCEDURE — 84702 CHORIONIC GONADOTROPIN TEST: CPT

## 2024-02-08 RX ORDER — DOCUSATE SODIUM 100 MG/1
100 CAPSULE, LIQUID FILLED ORAL 2 TIMES DAILY PRN
Qty: 60 CAPSULE | Refills: 0 | Status: SHIPPED | OUTPATIENT
Start: 2024-02-08

## 2024-02-09 LAB — TREPONEMA PALLIDUM IGG+IGM AB [PRESENCE] IN SERUM OR PLASMA BY IMMUNOASSAY: NONREACTIVE

## 2024-02-09 NOTE — PROGRESS NOTES
"GYN Problem note  Jazmin Bowman  is a 43 y.o. who presents to the office today without an appointment with c/o actively having a miscarriage.  Was seen 11/30/2023 for a similar complaint. Pt has a significant mental health hx including paranoid schizophrenia, anxiety, depression, ADHD.   Pt reports feeling fetal movement and that her uterus keeps getting bigger. Believes her hormones are low and maybe that is why the testing continues to be negative for pregnancy. States she is not currently bleeding.        Physical Exam   Physical Exam  Constitutional:       Appearance: Normal appearance.   Genitourinary:      Vulva normal.      Genitourinary Comments: Internal exam normal   HENT:      Head: Normocephalic and atraumatic.   Pulmonary:      Effort: Pulmonary effort is normal.   Abdominal:      General: Abdomen is flat. There is distension.      Palpations: Abdomen is soft.   Neurological:      Mental Status: She is alert.          There were no vitals taken for this visit.         Assessment/Plan   Cervical cancer screening  Pap collected, last pap in 2023 was negative for HPV but was not interpreted because of \"PAUCITY OF CELLULARITY\"  Pap in 2020 was ASCUS/HPV positive  Abdominal bloating/phantom fetal movement  Pelvic and abdominal U/S ordered  Beta hCG ordered  Potential exposure to STIs  GC/CT, trich off pap, blood work ordered    Will call with results, return to care as needed.    Fanta Das, TRAY-JERSON          "

## 2024-02-13 LAB
C TRACH RRNA SPEC QL NAA+PROBE: NEGATIVE
N GONORRHOEA DNA SPEC QL PROBE+SIG AMP: NEGATIVE
T VAGINALIS RRNA SPEC QL NAA+PROBE: NEGATIVE

## 2024-02-14 ENCOUNTER — HOSPITAL ENCOUNTER (OUTPATIENT)
Dept: RADIOLOGY | Facility: CLINIC | Age: 44
End: 2024-02-14
Payer: COMMERCIAL

## 2024-02-14 ENCOUNTER — HOSPITAL ENCOUNTER (OUTPATIENT)
Dept: RADIOLOGY | Facility: CLINIC | Age: 44
Discharge: HOME | End: 2024-02-14
Payer: COMMERCIAL

## 2024-02-14 DIAGNOSIS — R14.0 ABDOMINAL DISTENSION: ICD-10-CM

## 2024-02-14 DIAGNOSIS — O03.4 RETAINED PRODUCTS OF CONCEPTION AFTER MISCARRIAGE (HHS-HCC): ICD-10-CM

## 2024-02-14 PROCEDURE — 76700 US EXAM ABDOM COMPLETE: CPT

## 2024-02-14 PROCEDURE — 76856 US EXAM PELVIC COMPLETE: CPT

## 2024-02-14 PROCEDURE — 76700 US EXAM ABDOM COMPLETE: CPT | Performed by: RADIOLOGY

## 2024-02-14 PROCEDURE — 76830 TRANSVAGINAL US NON-OB: CPT | Performed by: RADIOLOGY

## 2024-02-14 PROCEDURE — 76856 US EXAM PELVIC COMPLETE: CPT | Performed by: RADIOLOGY

## 2024-02-16 DIAGNOSIS — R14.0 ABDOMINAL DISTENSION: Primary | ICD-10-CM

## 2024-02-20 ENCOUNTER — LAB (OUTPATIENT)
Dept: LAB | Facility: LAB | Age: 44
End: 2024-02-20
Payer: COMMERCIAL

## 2024-02-20 ENCOUNTER — OFFICE VISIT (OUTPATIENT)
Dept: PRIMARY CARE | Facility: CLINIC | Age: 44
End: 2024-02-20
Payer: COMMERCIAL

## 2024-02-20 VITALS
HEIGHT: 67 IN | SYSTOLIC BLOOD PRESSURE: 120 MMHG | DIASTOLIC BLOOD PRESSURE: 80 MMHG | TEMPERATURE: 98.2 F | WEIGHT: 131 LBS | BODY MASS INDEX: 20.56 KG/M2 | HEART RATE: 100 BPM | RESPIRATION RATE: 16 BRPM

## 2024-02-20 DIAGNOSIS — G43.909 MIGRAINE SYNDROME: Primary | ICD-10-CM

## 2024-02-20 DIAGNOSIS — R79.89 ABNORMAL TSH: ICD-10-CM

## 2024-02-20 PROBLEM — R42 DIZZINESS AND GIDDINESS: Status: ACTIVE | Noted: 2018-11-21

## 2024-02-20 PROBLEM — N92.6 MISSED PERIOD: Status: ACTIVE | Noted: 2023-12-07

## 2024-02-20 PROBLEM — G47.9 SLEEP DISTURBANCE: Status: ACTIVE | Noted: 2023-05-04

## 2024-02-20 PROBLEM — Z86.69 HISTORY OF OPTIC NEURITIS: Status: ACTIVE | Noted: 2024-02-20

## 2024-02-20 PROBLEM — Z86.59 HISTORY OF DEPRESSION: Status: ACTIVE | Noted: 2024-02-20

## 2024-02-20 PROBLEM — M54.50 LOW BACK PAIN: Status: ACTIVE | Noted: 2018-11-21

## 2024-02-20 PROBLEM — N87.9 CERVICAL ATYPIA: Status: ACTIVE | Noted: 2023-04-03

## 2024-02-20 LAB
FT4I SERPL CALC-MCNC: 3.7 (ref 1.6–4.7)
T3FREE SERPL-MCNC: 4.8 PG/ML (ref 2.3–4.2)
T3RU NFR SERPL: 36 % (ref 24–41)
T4 SERPL-MCNC: 10.3 UG/DL (ref 4.5–11.1)
TSH SERPL-ACNC: 1.37 MIU/L (ref 0.44–3.98)

## 2024-02-20 PROCEDURE — 84481 FREE ASSAY (FT-3): CPT

## 2024-02-20 PROCEDURE — 84436 ASSAY OF TOTAL THYROXINE: CPT

## 2024-02-20 PROCEDURE — 84479 ASSAY OF THYROID (T3 OR T4): CPT

## 2024-02-20 PROCEDURE — 36415 COLL VENOUS BLD VENIPUNCTURE: CPT

## 2024-02-20 PROCEDURE — 99214 OFFICE O/P EST MOD 30 MIN: CPT | Performed by: FAMILY MEDICINE

## 2024-02-20 PROCEDURE — 84443 ASSAY THYROID STIM HORMONE: CPT

## 2024-02-20 PROCEDURE — 1036F TOBACCO NON-USER: CPT | Performed by: FAMILY MEDICINE

## 2024-02-20 RX ORDER — PALIPERIDONE PALMITATE 234 MG/1.5ML
INJECTION INTRAMUSCULAR
COMMUNITY
Start: 2024-01-19

## 2024-02-20 RX ORDER — RIZATRIPTAN BENZOATE 5 MG/1
5 TABLET ORAL ONCE AS NEEDED
Qty: 20 TABLET | Refills: 2 | Status: SHIPPED | OUTPATIENT
Start: 2024-02-20

## 2024-02-20 RX ORDER — TRAZODONE HYDROCHLORIDE 50 MG/1
50 TABLET ORAL NIGHTLY PRN
COMMUNITY
Start: 2024-01-18

## 2024-02-20 RX ORDER — PRAZOSIN HYDROCHLORIDE 1 MG/1
1 CAPSULE ORAL NIGHTLY
COMMUNITY
Start: 2024-01-18

## 2024-02-20 RX ORDER — ONDANSETRON 8 MG/1
8 TABLET, ORALLY DISINTEGRATING ORAL EVERY 8 HOURS PRN
Qty: 30 TABLET | Refills: 1 | Status: SHIPPED | OUTPATIENT
Start: 2024-02-20 | End: 2024-05-01 | Stop reason: SDUPTHER

## 2024-02-20 NOTE — RESULT ENCOUNTER NOTE
LMTCB [FreeTextEntry1] : 31 y.o. M w/ h/o chronic right lateral lumbar/flank pain of unclear etiology.  I spent most of today's office visit (40 min) reviewing the patient's relevant imaging studies, discussing possible etiology, pathogenesis, further diagnostic work-up and non-operative management.  MRI L-spine w/o significant HNP, central canal or NF stenosis on patient's symptomatic right side.  There is note made of a non-acute appearing Schmorl's node at T10-11.  Pain is likely not visceral in nature.  Discussed utility of US guided right QL LA injections to better diagnosis his primary pain generator but would like patient to try more conservative measures first (ie, OMT, ART, etc.).  Will refer to Dr. Ram.  No role for LESI or LAKEISHAI.  Pt. and wife are in agreement with plan.  All questions answered.  RTC prn.

## 2024-02-20 NOTE — PROGRESS NOTES
"Jazmin Bowman is a 43 y.o. female here today for   Chief Complaint   Patient presents with    Migraine     Needs other med        HPI     Migraine HA's -patient says she is still having rather frequent migraine headaches at least twice a week.  She says the insurance would not cover the Nurtec that I wrote for at her last visit in November.  I had also referred her to a neurologist because of her complex history but she never called to set up an appointment.  She has an allergy listed to sumatriptan and I reviewed this.  She says that when she was given sumatriptan and she was also on multiple antidepressants and she felt fatigued and \"like her body could not move\".  She says she thinks it was actually the antidepressants and she would like to try a triptan again.  She and I had previously discussed that I will not prescribe Fioricet as previous doctors have because of her other medications and addiction potential.    She had a abnormal TSH in May 2023 and I ordered thyroid function tests at her last visit but she never got them done.    She does see a psychiatrist for posttraumatic stress disorder and other psychiatric illnesses.  She does take a benzodiazepine 3 times daily regularly.            Current Outpatient Medications:     Abilify Maintena 400 mg injection, inject 1 DOSE intramuscularly EVERY 4 WEEKS, Disp: , Rfl:     benztropine (Cogentin) 1 mg tablet, Take 1 tablet (1 mg) by mouth 2 times a day as needed., Disp: , Rfl:     clonazePAM (KlonoPIN) 0.5 mg tablet, Take by mouth., Disp: , Rfl:     docusate sodium (Colace) 100 mg capsule, Take 1 capsule (100 mg) by mouth 2 times a day as needed for constipation., Disp: 60 capsule, Rfl: 0    haloperidol (Haldol) 2 mg/mL solution, Take by mouth., Disp: , Rfl:     haloperidol decanoate (Haldol Decanoate) 100 mg/mL injection, inject 1 AND 1/2 milliliter intramuscularly every 4 weeks, Disp: , Rfl:     hydrOXYzine HCL (Atarax) 50 mg tablet, Take 1 tablet (50 mg) by " mouth 3 times a day as needed., Disp: , Rfl:     Invega Sustenna 234 mg/1.5 mL syringe, inject 1 intramuscularly every 4 weeks, Disp: , Rfl:     ondansetron (Zofran) 4 mg tablet, Take 1 tablet (4 mg) by mouth every 8 hours if needed for nausea or vomiting., Disp: 30 tablet, Rfl: 3    prazosin (Minipress) 1 mg capsule, Take 1 capsule (1 mg) by mouth once daily at bedtime., Disp: , Rfl:     traZODone (Desyrel) 50 mg tablet, Take 1 tablet (50 mg) by mouth as needed at bedtime., Disp: , Rfl:     ondansetron ODT (Zofran-ODT) 8 mg disintegrating tablet, Take 1 tablet (8 mg) by mouth every 8 hours if needed for nausea or vomiting., Disp: 30 tablet, Rfl: 1    rimegepant (Nurtec ODT) 75 mg tablet,disintegrating, Take 1 tablet (75 mg) by mouth every other day. (Patient not taking: Reported on 2/20/2024), Disp: 15 tablet, Rfl: 1    rizatriptan (Maxalt) 5 mg tablet, Take 1 tablet (5 mg) by mouth 1 time if needed for migraine (MAY REPEAT IN 2 HOURS IF NEEDED, MAXIMUM 30 MG IN 24 HOURS). May repeat in 2 hours if needed, Disp: 20 tablet, Rfl: 2    Patient Active Problem List   Diagnosis    ASCUS with positive high risk HPV cervical    Attention deficit hyperactivity disorder (ADHD), combined type    Episode of recurrent major depressive disorder (CMS/HCC)    Hereditary disease in family possibly affecting fetus    Panic disorder    Generalized anxiety disorder    Migraine    Maternal care for (suspected) damage to fetus by drugs    Insomnia    Paranoid schizophrenia (CMS/HCC)    Seizure (CMS/HCC)    Encounter for preventive health examination    Underweight    Extrapyramidal movement disorder, drug-induced    Elevated prolactin level    Abnormal TSH    Sleep disturbance    Missed period    Low back pain    History of optic neuritis    History of depression    Dizziness and giddiness    Cervical atypia         Recent Results (from the past 672 hour(s))   POCT pregnancy, urine manually resulted    Collection Time: 02/08/24 11:48 AM  "  Result Value Ref Range    Preg Test, Ur Negative Negative   Hepatitis B Surface Antigen    Collection Time: 02/08/24 12:30 PM   Result Value Ref Range    Hepatitis B Surface AG Nonreactive Nonreactive   Hepatitis C Antibody    Collection Time: 02/08/24 12:30 PM   Result Value Ref Range    Hepatitis C AB Nonreactive Nonreactive   Syphilis Screen with Reflex    Collection Time: 02/08/24 12:30 PM   Result Value Ref Range    Syphilis Total Ab Nonreactive Nonreactive   HIV 1/2 Antigen/Antibody Screen with Reflex to Confirmation    Collection Time: 02/08/24 12:30 PM   Result Value Ref Range    HIV 1/2 Antigen/Antibody Screen with Reflex to Confirmation Nonreactive Nonreactive   Human Chorionic Gonadotropin, Serum Quantitative    Collection Time: 02/08/24 12:30 PM   Result Value Ref Range    HCG, Beta-Quantitative <2 <5 mIU/mL   C. Trachomatis / N. Gonorrhoeae, Amplified Detection    Collection Time: 02/08/24 12:30 PM   Result Value Ref Range    Neisseria gonorrhea,Amplified Negative Negative    Chlamydia trachomatis, Amplified Negative Negative   Trichomonas vaginalis, Nucleic Acid Detection    Collection Time: 02/08/24 12:30 PM   Result Value Ref Range    Trichomonas Vaginalis Negative Negative, Invalid, TRICH neg        Objective    Visit Vitals    Visit Vitals  /80   Pulse 100   Temp 36.8 °C (98.2 °F)   Resp 16   Ht 1.702 m (5' 7\")   Wt 59.4 kg (131 lb)   LMP  (LMP Unknown)   BMI 20.52 kg/m²   OB Status Pregnant   Smoking Status Former   BSA 1.68 m²       Body mass index is 20.52 kg/m².     Physical Exam   General - Not in acute distress and cooperative.  Build & Nutrition - Well developed  Posture - Normal  Gait - Normal  Mental Status - alert and oriented x 3    Head - Normocephalic    Eyes - Bilateral - Sclera clear and lids pink without edema or mass.      Skin - Warm and dry with no rashes on visible skin    Neuropsychiatric - normal mood and affect, well groomed and good eye contact.  Able to articulate " well with normal speech/language, rate and coherence.  Associations are intact.  No evidence of hallucinations, delusions, obsessions or homicidal/suicidal ideation.  Attention span and ability to concentrate are normal.    Assessment    1. Migraine syndrome  rizatriptan (Maxalt) 5 mg tablet, ondansetron ODT (Zofran-ODT) 8 mg disintegrating tablet   We discussed options.  Her insurance would not cover Nurtec.  I offered to try to send it in again to see if they would allow it now but she declined.  She would like to try a triptan.  She never had any true rash or anaphylaxis type symptoms when she took sumatriptan and previously.  She and I discussed the possible risk if she does have a true allergy to triptans but she would like to try it because her headaches are so chronic.  I will send in a prescription for rizatriptan to use as needed and I will refill Zofran for as needed nausea.  I told her that she must see a neurologist in the future for her migraines because of the complexity of them and concomitant psychiatric illness.  She says she will definitely call a neurologist and set up an appointment for the future.     2. Abnormal TSH  Thyroid Stimulating Hormone, Free T4 Index, Triiodothyronine, Free   I will reorder the TSH and free T4 and free T3 to further assess her thyroid function and we will call her with results.

## 2024-02-21 NOTE — RESULT ENCOUNTER NOTE
Please inform the patient that her recent thyroid function labs did not show evidence of thyroid dysfunction.  No further labs are needed.

## 2024-02-27 ENCOUNTER — TELEPHONE (OUTPATIENT)
Dept: PRIMARY CARE | Facility: CLINIC | Age: 44
End: 2024-02-27
Payer: COMMERCIAL

## 2024-02-27 LAB
CYTOLOGY CMNT CVX/VAG CYTO-IMP: NORMAL
HPV HR 12 DNA GENITAL QL NAA+PROBE: NEGATIVE
HPV HR GENOTYPES PNL CVX NAA+PROBE: NEGATIVE
HPV16 DNA SPEC QL NAA+PROBE: NEGATIVE
HPV18 DNA SPEC QL NAA+PROBE: NEGATIVE
LAB AP HPV GENOTYPE QUESTION: YES
LAB AP HPV HR: NORMAL
LAB AP PAP ADDITIONAL TESTS: NORMAL
LAB AP PREVIOUS ABNORMAL HISTORY: NORMAL
LABORATORY COMMENT REPORT: NORMAL
PATH REPORT.TOTAL CANCER: NORMAL

## 2024-02-27 NOTE — TELEPHONE ENCOUNTER
Left several messages for patient to call back for results. Patient does not have [employer] set up so unable to send message.

## 2024-02-27 NOTE — TELEPHONE ENCOUNTER
----- Message from Daljit Berrios MD sent at 2/21/2024 12:39 PM EST -----  Please inform the patient that her recent thyroid function labs did not show evidence of thyroid dysfunction.  No further labs are needed.

## 2024-02-29 NOTE — RESULT ENCOUNTER NOTE
Per Fanta Das CNM when I sent letter 2/27/2024 for patient to call the office for test results she said that this letter is also still okay for this result as well.

## 2024-05-01 ENCOUNTER — OFFICE VISIT (OUTPATIENT)
Dept: PRIMARY CARE | Facility: CLINIC | Age: 44
End: 2024-05-01
Payer: COMMERCIAL

## 2024-05-01 VITALS
WEIGHT: 134 LBS | HEIGHT: 67 IN | DIASTOLIC BLOOD PRESSURE: 70 MMHG | RESPIRATION RATE: 18 BRPM | BODY MASS INDEX: 21.03 KG/M2 | SYSTOLIC BLOOD PRESSURE: 124 MMHG | TEMPERATURE: 97 F | HEART RATE: 110 BPM

## 2024-05-01 DIAGNOSIS — G43.909 MIGRAINE WITHOUT STATUS MIGRAINOSUS, NOT INTRACTABLE, UNSPECIFIED MIGRAINE TYPE: ICD-10-CM

## 2024-05-01 DIAGNOSIS — G43.909 MIGRAINE SYNDROME: ICD-10-CM

## 2024-05-01 DIAGNOSIS — F41.1 GENERALIZED ANXIETY DISORDER: Primary | ICD-10-CM

## 2024-05-01 PROBLEM — R19.00 ABDOMINAL SWELLING: Status: ACTIVE | Noted: 2024-02-14

## 2024-05-01 PROBLEM — O03.4 RETAINED PRODUCTS OF CONCEPTION AFTER MISCARRIAGE (HHS-HCC): Status: ACTIVE | Noted: 2024-02-14

## 2024-05-01 PROCEDURE — 1036F TOBACCO NON-USER: CPT | Performed by: FAMILY MEDICINE

## 2024-05-01 PROCEDURE — 99213 OFFICE O/P EST LOW 20 MIN: CPT | Performed by: FAMILY MEDICINE

## 2024-05-01 RX ORDER — ONDANSETRON 8 MG/1
8 TABLET, ORALLY DISINTEGRATING ORAL EVERY 8 HOURS PRN
Qty: 30 TABLET | Refills: 1 | Status: SHIPPED | OUTPATIENT
Start: 2024-05-01

## 2024-05-01 RX ORDER — MIRTAZAPINE 7.5 MG/1
7.5 TABLET, FILM COATED ORAL NIGHTLY
COMMUNITY
Start: 2024-04-23

## 2024-05-01 NOTE — PROGRESS NOTES
Jazmin Bowman is a 43 y.o. female here today for   Chief Complaint   Patient presents with    Results        HPI     Patient is here to review recent thyroid labs.  She had had a slightly abnormal TSH about 1 year ago but her repeat thyroid function tests were essentially normal.  I reviewed these with her today.  I told her that no further testing is needed at this time.    She is still having nausea on occasion at times.   She takes Zofran about every other day for nausea.  She says she only has nausea that is associated with anxiety.  She says she gets anxious because she thinks about a previous miscarriage very frequently.  She does see her psychiatrist regularly and has an appointment with him in the near future.  She asks if I can refill Zofran for as needed use.    She has a history of migraines and I gave her a prescription of rizatriptan at her last visit.  She says she tried it once but it did not seem to help her headache and she has not tried it again.  She is only using Tylenol as needed for migraine headaches now.  Her insurance previously would not allow Nurtec.    Current Outpatient Medications:     Abilify Maintena 400 mg injection, inject 1 DOSE intramuscularly EVERY 4 WEEKS, Disp: , Rfl:     benztropine (Cogentin) 1 mg tablet, Take 1 tablet (1 mg) by mouth 2 times a day as needed., Disp: , Rfl:     clonazePAM (KlonoPIN) 0.5 mg tablet, Take by mouth., Disp: , Rfl:     docusate sodium (Colace) 100 mg capsule, Take 1 capsule (100 mg) by mouth 2 times a day as needed for constipation., Disp: 60 capsule, Rfl: 0    haloperidol (Haldol) 2 mg/mL solution, Take by mouth., Disp: , Rfl:     haloperidol decanoate (Haldol Decanoate) 100 mg/mL injection, inject 1 AND 1/2 milliliter intramuscularly every 4 weeks, Disp: , Rfl:     hydrOXYzine HCL (Atarax) 50 mg tablet, Take 1 tablet (50 mg) by mouth 3 times a day as needed., Disp: , Rfl:     Invega Sustenna 234 mg/1.5 mL syringe, inject 1 intramuscularly every 4  "weeks, Disp: , Rfl:     mirtazapine (Remeron) 7.5 mg tablet, Take 1 tablet (7.5 mg) by mouth once daily at bedtime., Disp: , Rfl:     ondansetron (Zofran) 4 mg tablet, Take 1 tablet (4 mg) by mouth every 8 hours if needed for nausea or vomiting., Disp: 30 tablet, Rfl: 3    prazosin (Minipress) 1 mg capsule, Take 1 capsule (1 mg) by mouth once daily at bedtime., Disp: , Rfl:     rizatriptan (Maxalt) 5 mg tablet, Take 1 tablet (5 mg) by mouth 1 time if needed for migraine (MAY REPEAT IN 2 HOURS IF NEEDED, MAXIMUM 30 MG IN 24 HOURS). May repeat in 2 hours if needed, Disp: 20 tablet, Rfl: 2    traZODone (Desyrel) 50 mg tablet, Take 1 tablet (50 mg) by mouth as needed at bedtime., Disp: , Rfl:     ondansetron ODT (Zofran-ODT) 8 mg disintegrating tablet, Take 1 tablet (8 mg) by mouth every 8 hours if needed for nausea or vomiting., Disp: 30 tablet, Rfl: 1    Patient Active Problem List   Diagnosis    ASCUS with positive high risk HPV cervical    Attention deficit hyperactivity disorder (ADHD), combined type    Episode of recurrent major depressive disorder (CMS-HCC)    Hereditary disease in family possibly affecting fetus ([facility])    Panic disorder    Generalized anxiety disorder    Migraine    Maternal care for (suspected) damage to fetus by drugs ([facility])    Insomnia    Paranoid schizophrenia (Multi)    Seizure (Multi)    Encounter for preventive health examination    Underweight    Extrapyramidal movement disorder, drug-induced    Elevated prolactin level    Abnormal TSH    Sleep disturbance    Missed period    Low back pain    History of optic neuritis    History of depression    Dizziness and giddiness    Cervical atypia    Abdominal swelling    Retained products of conception after miscarriage ([facility])         No results found for this or any previous visit (from the past 672 hour(s)).     Objective    Visit Vitals    Visit Vitals  /70   Pulse 110   Temp 36.1 °C (97 °F)   Resp 18   Ht 1.702 m (5' 7\")   Wt " 60.8 kg (134 lb)   LMP  (LMP Unknown)   BMI 20.99 kg/m²   OB Status Pregnant   Smoking Status Former   BSA 1.7 m²       Body mass index is 20.99 kg/m².     Physical Exam     General - Not in acute distress and cooperative.  Build & Nutrition - Well developed  Posture - Normal  Gait - Normal  Mental Status - alert and oriented x 3    Head - Normocephalic    Eyes - Bilateral - Sclera clear and lids pink without edema or mass.      Skin - Warm and dry with no rashes on visible skin    Neuropsychiatric - normal mood and affect, well groomed and good eye contact.  Able to articulate well with normal speech/language, rate and coherence.  Associations are intact.  No evidence of hallucinations, delusions, obsessions or homicidal/suicidal ideation.  Attention span and ability to concentrate are normal.  Assessment    1. Generalized anxiety disorder     I told the patient that if her anxiety is not well-controlled and causing frequent episodes of nausea she needs to discuss this with her psychiatrist further.  Just masking the nausea with Zofran every other day is not a good idea.  I will refill the Zofran for 30 days with 1 refill but I told her I will not refill it again for at least 6 months so she needs to use it very sparingly.     2. Migraine syndrome  ondansetron ODT (Zofran-ODT) 8 mg disintegrating tablet   She says the rizatriptan did not work but she only tried it once.  I recommend that she try it again at least 2 or 3 more times for future migraines.  She can follow-up with me as needed.     3. Migraine without status migrainosus, not intractable, unspecified migraine type              Orders Placed This Encounter      ondansetron ODT (Zofran-ODT) 8 mg disintegrating tablet       No orders of the defined types were placed in this encounter.       New Medications Ordered This Visit   Medications    mirtazapine (Remeron) 7.5 mg tablet     Sig: Take 1 tablet (7.5 mg) by mouth once daily at bedtime.    ondansetron  ODT (Zofran-ODT) 8 mg disintegrating tablet     Sig: Take 1 tablet (8 mg) by mouth every 8 hours if needed for nausea or vomiting.     Dispense:  30 tablet     Refill:  1

## 2024-05-22 ENCOUNTER — OFFICE VISIT (OUTPATIENT)
Dept: PRIMARY CARE | Facility: CLINIC | Age: 44
End: 2024-05-22
Payer: COMMERCIAL

## 2024-05-22 VITALS
RESPIRATION RATE: 18 BRPM | TEMPERATURE: 97 F | BODY MASS INDEX: 21.82 KG/M2 | HEIGHT: 67 IN | DIASTOLIC BLOOD PRESSURE: 72 MMHG | SYSTOLIC BLOOD PRESSURE: 110 MMHG | WEIGHT: 139 LBS | HEART RATE: 110 BPM

## 2024-05-22 DIAGNOSIS — B37.31 YEAST VAGINITIS: ICD-10-CM

## 2024-05-22 DIAGNOSIS — K59.00 CONSTIPATION, UNSPECIFIED CONSTIPATION TYPE: Primary | ICD-10-CM

## 2024-05-22 PROCEDURE — 99214 OFFICE O/P EST MOD 30 MIN: CPT | Performed by: FAMILY MEDICINE

## 2024-05-22 RX ORDER — FLUCONAZOLE 150 MG/1
150 TABLET ORAL DAILY
Qty: 3 TABLET | Refills: 0 | Status: SHIPPED | OUTPATIENT
Start: 2024-05-22

## 2024-05-22 RX ORDER — POLYETHYLENE GLYCOL 3350 17 G/17G
17 POWDER, FOR SOLUTION ORAL DAILY PRN
Qty: 30 PACKET | Refills: 6 | Status: SHIPPED | OUTPATIENT
Start: 2024-05-22

## 2024-05-22 NOTE — PROGRESS NOTES
"Jazmin Bowman is a 43 y.o. female here today for   Chief Complaint   Patient presents with    Insect Bite        HPI     Patient says earlier today I she thought she saw a small white bug on her skin.  She saw this when she was at home and it was on her left lower abdomen.  She says she thought it went into her through her skin and she is here asking about possible STD testing.  She has not seen any other insects or bugs.  She says it looked like a very small crab.  I showed her pictures of crab lice and crab lice on the skin but she said it was definitely not this.  I also showed her pictures of ticks but she says it was definitely not this.  She has not noticed any redness or soreness in this area.  On review she is sexually monogamous with her male partner who is her fiancé for many years.  She denies any history of crab lice infestation or any other STIs.  She denies any urinary symptoms.    She is having yeast infection sxs with itching for about one month.  Some occasional white dc.  She denies any urinary symptoms and says that she knows it is a yeast infection because she has had them before.    Right ear - feels a little sore.  But she denies any other symptoms.  There is no discharge or other URI symptoms.    She is also concerned because she says her spine feels \"wavy\".  When she reaches back and runs her hand up and down her spine she says it feels wavy.  She is not having any pain in this area but she just asked if I could check her back.    She is having some constipation since about 2 years ago.  No blood in stools.  Hard stools and hard to pass at times.        Current Outpatient Medications:     Abilify Maintena 400 mg injection, inject 1 DOSE intramuscularly EVERY 4 WEEKS, Disp: , Rfl:     benztropine (Cogentin) 1 mg tablet, Take 1 tablet (1 mg) by mouth 2 times a day as needed., Disp: , Rfl:     clonazePAM (KlonoPIN) 0.5 mg tablet, Take by mouth., Disp: , Rfl:     docusate sodium (Colace) 100 mg " capsule, Take 1 capsule (100 mg) by mouth 2 times a day as needed for constipation., Disp: 60 capsule, Rfl: 0    haloperidol (Haldol) 2 mg/mL solution, Take by mouth., Disp: , Rfl:     haloperidol decanoate (Haldol Decanoate) 100 mg/mL injection, inject 1 AND 1/2 milliliter intramuscularly every 4 weeks, Disp: , Rfl:     hydrOXYzine HCL (Atarax) 50 mg tablet, Take 1 tablet (50 mg) by mouth 3 times a day as needed., Disp: , Rfl:     Invega Sustenna 234 mg/1.5 mL syringe, inject 1 intramuscularly every 4 weeks, Disp: , Rfl:     mirtazapine (Remeron) 7.5 mg tablet, Take 1 tablet (7.5 mg) by mouth once daily at bedtime., Disp: , Rfl:     ondansetron (Zofran) 4 mg tablet, Take 1 tablet (4 mg) by mouth every 8 hours if needed for nausea or vomiting., Disp: 30 tablet, Rfl: 3    ondansetron ODT (Zofran-ODT) 8 mg disintegrating tablet, Take 1 tablet (8 mg) by mouth every 8 hours if needed for nausea or vomiting., Disp: 30 tablet, Rfl: 1    prazosin (Minipress) 1 mg capsule, Take 1 capsule (1 mg) by mouth once daily at bedtime., Disp: , Rfl:     rizatriptan (Maxalt) 5 mg tablet, Take 1 tablet (5 mg) by mouth 1 time if needed for migraine (MAY REPEAT IN 2 HOURS IF NEEDED, MAXIMUM 30 MG IN 24 HOURS). May repeat in 2 hours if needed, Disp: 20 tablet, Rfl: 2    traZODone (Desyrel) 50 mg tablet, Take 1 tablet (50 mg) by mouth as needed at bedtime., Disp: , Rfl:     fluconazole (Diflucan) 150 mg tablet, Take 1 tablet (150 mg) by mouth once daily., Disp: 3 tablet, Rfl: 0    polyethylene glycol (Glycolax, Miralax) 17 gram packet, Take 17 g by mouth once daily as needed (constipation). Mix 1 cap (17g) into 8 ounces of fluid., Disp: 30 packet, Rfl: 6    Patient Active Problem List   Diagnosis    ASCUS with positive high risk HPV cervical    Attention deficit hyperactivity disorder (ADHD), combined type    Constipation    Episode of recurrent major depressive disorder (CMS-HCC)    Hereditary disease in family possibly affecting fetus  "([facility])    Panic disorder    Generalized anxiety disorder    Migraine    Maternal care for (suspected) damage to fetus by drugs ([facility])    Insomnia    Paranoid schizophrenia (Multi)    Seizure (Multi)    Yeast vaginitis    Encounter for preventive health examination    Underweight    Extrapyramidal movement disorder, drug-induced    Elevated prolactin level    Abnormal TSH    Sleep disturbance    Missed period    Low back pain    History of optic neuritis    History of depression    Dizziness and giddiness    Cervical atypia    Abdominal swelling    Retained products of conception after miscarriage ([facility])         No results found for this or any previous visit (from the past 672 hour(s)).     Objective    Visit Vitals    Visit Vitals  /72   Pulse 110   Temp 36.1 °C (97 °F)   Resp 18   Ht 1.702 m (5' 7\")   Wt 63 kg (139 lb)   LMP  (LMP Unknown)   BMI 21.77 kg/m²   OB Status Pregnant   Smoking Status Former   BSA 1.73 m²       Body mass index is 21.77 kg/m².     Physical Exam   Skin-over her left lower abdomen I see no evidence of a insect bite or penetrating wound through the skin.  There is no erythema or warmth.    Back-her lower back exam is completely normal.  I think she was feeling the individual vertebrae which is why it feels wavy.    Ears-bilateral ear exam is normal.    Abdomen-soft and nontender and nondistended with normal bowel sounds throughout.    Assessment    1. Constipation, unspecified constipation type  polyethylene glycol (Glycolax, Miralax) 17 gram packet   I recommend to increase fiber and water intake with more fruits and vegetables.  She can use MiraLAX 1 packet 3-4 times weekly as needed.     2. Yeast vaginitis  fluconazole (Diflucan) 150 mg tablet   She declined vaginal swab or exam today and we will treat presumptively with fluconazole 150 mg once daily for up to 3 days as needed.     I reassured her that I see no evidence of a bug bite on her left lower abdomen.  I told her " there is no creature that I know of that can burrow into the skin and disappear quickly as she described.  I told her to follow-up with me as needed.    I reassured her that her back exam is normal and I think she was just feeling the undulations of the spinous processes of the spine with her hands.    Orders Placed This Encounter      fluconazole (Diflucan) 150 mg tablet      polyethylene glycol (Glycolax, Miralax) 17 gram packet       No orders of the defined types were placed in this encounter.       New Medications Ordered This Visit   Medications    polyethylene glycol (Glycolax, Miralax) 17 gram packet     Sig: Take 17 g by mouth once daily as needed (constipation). Mix 1 cap (17g) into 8 ounces of fluid.     Dispense:  30 packet     Refill:  6    fluconazole (Diflucan) 150 mg tablet     Sig: Take 1 tablet (150 mg) by mouth once daily.     Dispense:  3 tablet     Refill:  0

## 2024-07-02 DIAGNOSIS — G43.909 MIGRAINE SYNDROME: ICD-10-CM

## 2024-07-02 RX ORDER — ONDANSETRON 8 MG/1
TABLET, ORALLY DISINTEGRATING ORAL
Qty: 30 TABLET | Refills: 1 | Status: SHIPPED | OUTPATIENT
Start: 2024-07-02

## 2024-08-14 ENCOUNTER — APPOINTMENT (OUTPATIENT)
Dept: PRIMARY CARE | Facility: CLINIC | Age: 44
End: 2024-08-14
Payer: COMMERCIAL

## 2024-08-14 VITALS
HEIGHT: 67 IN | WEIGHT: 150 LBS | SYSTOLIC BLOOD PRESSURE: 124 MMHG | TEMPERATURE: 97 F | DIASTOLIC BLOOD PRESSURE: 88 MMHG | BODY MASS INDEX: 23.54 KG/M2 | HEART RATE: 106 BPM | RESPIRATION RATE: 16 BRPM

## 2024-08-14 DIAGNOSIS — Z11.3 SCREEN FOR STD (SEXUALLY TRANSMITTED DISEASE): ICD-10-CM

## 2024-08-14 DIAGNOSIS — G43.909 MIGRAINE SYNDROME: Primary | ICD-10-CM

## 2024-08-14 PROCEDURE — 3008F BODY MASS INDEX DOCD: CPT | Performed by: FAMILY MEDICINE

## 2024-08-14 PROCEDURE — 99213 OFFICE O/P EST LOW 20 MIN: CPT | Performed by: FAMILY MEDICINE

## 2024-08-14 PROCEDURE — 1036F TOBACCO NON-USER: CPT | Performed by: FAMILY MEDICINE

## 2024-08-14 RX ORDER — ONDANSETRON 4 MG/1
4 TABLET, FILM COATED ORAL EVERY 8 HOURS PRN
Qty: 30 TABLET | Refills: 3 | Status: SHIPPED | OUTPATIENT
Start: 2024-08-14

## 2024-08-14 NOTE — PROGRESS NOTES
Jazmin Bowman is a 44 y.o. female here today for   Chief Complaint   Patient presents with    Migraine        Migraine HA's - she takes Zofran as needed.  Triptans have not worked in the past.  Her insurance had previously denied Nurtec.  She has now tried 2 different triptans without success so I am going to send in a prescription for Nurtec again.    STD screening.  She recently broke up with her fiancé and requests STD screening just to be safe.  She says he and she have no history of STDs in the past and she has no symptoms on review but she just wants to be safe.    Sees psychiatrist regularly.      Current Outpatient Medications:     Abilify Maintena 400 mg injection, inject 1 DOSE intramuscularly EVERY 4 WEEKS, Disp: , Rfl:     benztropine (Cogentin) 1 mg tablet, Take 1 tablet (1 mg) by mouth 2 times a day as needed., Disp: , Rfl:     clonazePAM (KlonoPIN) 0.5 mg tablet, Take by mouth., Disp: , Rfl:     docusate sodium (Colace) 100 mg capsule, Take 1 capsule (100 mg) by mouth 2 times a day as needed for constipation., Disp: 60 capsule, Rfl: 0    hydrOXYzine HCL (Atarax) 50 mg tablet, Take 1 tablet (50 mg) by mouth 3 times a day as needed., Disp: , Rfl:     Invega Sustenna 234 mg/1.5 mL syringe, inject 1 intramuscularly every 4 weeks, Disp: , Rfl:     mirtazapine (Remeron) 7.5 mg tablet, Take 1 tablet (7.5 mg) by mouth once daily at bedtime., Disp: , Rfl:     ondansetron ODT (Zofran-ODT) 8 mg disintegrating tablet, Take 1 tablet by mouth every 8 hours if needed for nausea or vomiting., Disp: 30 tablet, Rfl: 1    polyethylene glycol (Glycolax, Miralax) 17 gram packet, Take 17 g by mouth once daily as needed (constipation). Mix 1 cap (17g) into 8 ounces of fluid., Disp: 30 packet, Rfl: 6    prazosin (Minipress) 1 mg capsule, Take 1 capsule (1 mg) by mouth once daily at bedtime., Disp: , Rfl:     traZODone (Desyrel) 50 mg tablet, Take 1 tablet (50 mg) by mouth as needed at bedtime., Disp: , Rfl:     ondansetron  "(Zofran) 4 mg tablet, Take 1 tablet (4 mg) by mouth every 8 hours if needed for nausea or vomiting., Disp: 30 tablet, Rfl: 3    rimegepant (Nurtec ODT) 75 mg tablet,disintegrating, Take one tablet daily as needed for migraines., Disp: 15 tablet, Rfl: 6    Patient Active Problem List   Diagnosis    ASCUS with positive high risk HPV cervical    Attention deficit hyperactivity disorder (ADHD), combined type    Constipation    Episode of recurrent major depressive disorder (CMS-[facility])    Hereditary disease in family possibly affecting fetus ([facility])    Panic disorder    Generalized anxiety disorder    Migraine    Maternal care for (suspected) damage to fetus by drugs ([facility])    Insomnia    Paranoid schizophrenia (Multi)    Seizure (Multi)    Yeast vaginitis    Encounter for preventive health examination    Underweight    Extrapyramidal movement disorder, drug-induced    Elevated prolactin level    Abnormal TSH    Sleep disturbance    Missed period    Low back pain    History of optic neuritis    History of depression    Dizziness and giddiness    Cervical atypia    Abdominal swelling    Retained products of conception after miscarriage ([facility])         No results found for this or any previous visit (from the past 672 hour(s)).     Objective    Visit Vitals    Visit Vitals  /88   Pulse 106   Temp 36.1 °C (97 °F)   Resp 16   Ht 1.702 m (5' 7\")   Wt 68 kg (150 lb)   LMP  (LMP Unknown)   BMI 23.49 kg/m²   OB Status Pregnant   Smoking Status Former   BSA 1.79 m²       Body mass index is 23.49 kg/m².     Physical Exam   General - Not in acute distress and cooperative.  Build & Nutrition - Well developed  Posture - Normal  Gait - Normal  Mental Status - alert and oriented x 3    Head - Normocephalic    Eyes - Bilateral - Sclera clear and lids pink without edema or mass.      Skin - Warm and dry with no rashes on visible skin    Neuropsychiatric - normal mood and affect, well groomed and good eye contact.  Able to " articulate well with normal speech/language, rate and coherence.  Associations are intact.  No evidence of hallucinations, delusions, obsessions or homicidal/suicidal ideation.  Attention span and ability to concentrate are normal.    Assessment    1. Migraine syndrome  rimegepant (Nurtec ODT) 75 mg tablet,disintegrating, TSH with reflex to Free T4 if abnormal, Comprehensive Metabolic Panel, ondansetron (Zofran) 4 mg tablet   The patient has failed on sumatriptan and rizatriptan for acute migraine treatment.  I am going to order Nurtec 75 mg and hopefully her insurance will allow this now.  I also refilled Zofran for as needed use.     2. Screen for STD (sexually transmitted disease)  rimegepant (Nurtec ODT) 75 mg tablet,disintegrating, C. Trachomatis / N. Gonorrhoeae, Amplified Detection, Hepatitis B Core Antibody, IgM, Hepatitis C Antibody, HIV 1/2 Antigen/Antibody Screen with Reflex to Confirmation, Syphilis Screen with Reflex   At her request I will order STI screening.  We will call her with results.         Orders Placed This Encounter      ondansetron (Zofran) 4 mg tablet      rimegepant (Nurtec ODT) 75 mg tablet,disintegrating       Orders Placed This Encounter   Procedures    C. Trachomatis / N. Gonorrhoeae, Amplified Detection    Hepatitis B Core Antibody, IgM    Hepatitis C Antibody    HIV 1/2 Antigen/Antibody Screen with Reflex to Confirmation    Syphilis Screen with Reflex    TSH with reflex to Free T4 if abnormal    Comprehensive Metabolic Panel        New Medications Ordered This Visit   Medications    rimegepant (Nurtec ODT) 75 mg tablet,disintegrating     Sig: Take one tablet daily as needed for migraines.     Dispense:  15 tablet     Refill:  6    ondansetron (Zofran) 4 mg tablet     Sig: Take 1 tablet (4 mg) by mouth every 8 hours if needed for nausea or vomiting.     Dispense:  30 tablet     Refill:  3

## 2024-08-21 ENCOUNTER — TELEPHONE (OUTPATIENT)
Dept: PRIMARY CARE | Facility: CLINIC | Age: 44
End: 2024-08-21

## 2024-08-21 ENCOUNTER — LAB (OUTPATIENT)
Dept: LAB | Facility: LAB | Age: 44
End: 2024-08-21
Payer: COMMERCIAL

## 2024-08-21 DIAGNOSIS — Z11.3 SCREEN FOR STD (SEXUALLY TRANSMITTED DISEASE): ICD-10-CM

## 2024-08-21 DIAGNOSIS — G43.909 MIGRAINE SYNDROME: ICD-10-CM

## 2024-08-21 LAB
ALBUMIN SERPL BCP-MCNC: 4.1 G/DL (ref 3.4–5)
ALP SERPL-CCNC: 63 U/L (ref 33–110)
ALT SERPL W P-5'-P-CCNC: 11 U/L (ref 7–45)
ANION GAP SERPL CALC-SCNC: 13 MMOL/L (ref 10–20)
AST SERPL W P-5'-P-CCNC: 12 U/L (ref 9–39)
BILIRUB SERPL-MCNC: 0.4 MG/DL (ref 0–1.2)
BUN SERPL-MCNC: 8 MG/DL (ref 6–23)
CALCIUM SERPL-MCNC: 9.6 MG/DL (ref 8.6–10.3)
CHLORIDE SERPL-SCNC: 97 MMOL/L (ref 98–107)
CO2 SERPL-SCNC: 28 MMOL/L (ref 21–32)
CREAT SERPL-MCNC: 0.63 MG/DL (ref 0.5–1.05)
EGFRCR SERPLBLD CKD-EPI 2021: >90 ML/MIN/1.73M*2
GLUCOSE SERPL-MCNC: 125 MG/DL (ref 74–99)
POTASSIUM SERPL-SCNC: 3.6 MMOL/L (ref 3.5–5.3)
PROT SERPL-MCNC: 6.9 G/DL (ref 6.4–8.2)
SODIUM SERPL-SCNC: 134 MMOL/L (ref 136–145)
T4 FREE SERPL-MCNC: 1.18 NG/DL (ref 0.61–1.12)
TSH SERPL-ACNC: 0.41 MIU/L (ref 0.44–3.98)

## 2024-08-21 PROCEDURE — 86803 HEPATITIS C AB TEST: CPT

## 2024-08-21 PROCEDURE — 80053 COMPREHEN METABOLIC PANEL: CPT

## 2024-08-21 PROCEDURE — 84439 ASSAY OF FREE THYROXINE: CPT

## 2024-08-21 PROCEDURE — 84443 ASSAY THYROID STIM HORMONE: CPT

## 2024-08-21 PROCEDURE — 86780 TREPONEMA PALLIDUM: CPT

## 2024-08-21 PROCEDURE — 86705 HEP B CORE ANTIBODY IGM: CPT

## 2024-08-21 PROCEDURE — 87591 N.GONORRHOEAE DNA AMP PROB: CPT

## 2024-08-21 PROCEDURE — 87491 CHLMYD TRACH DNA AMP PROBE: CPT

## 2024-08-21 PROCEDURE — 36415 COLL VENOUS BLD VENIPUNCTURE: CPT

## 2024-08-21 PROCEDURE — 87389 HIV-1 AG W/HIV-1&-2 AB AG IA: CPT

## 2024-08-22 LAB
C TRACH RRNA SPEC QL NAA+PROBE: NEGATIVE
HBV CORE IGM SER QL: NONREACTIVE
HCV AB SER QL: NONREACTIVE
HIV 1+2 AB+HIV1 P24 AG SERPL QL IA: NONREACTIVE
N GONORRHOEA DNA SPEC QL PROBE+SIG AMP: NEGATIVE
TREPONEMA PALLIDUM IGG+IGM AB [PRESENCE] IN SERUM OR PLASMA BY IMMUNOASSAY: NONREACTIVE

## 2024-08-22 NOTE — RESULT ENCOUNTER NOTE
Please inform the patient that her recent labs show that she may have a thyroid that is overproducing thyroid hormones.  This is called hyperthyroidism.  We need to get her to see an endocrinologist for further evaluation to confirm this and see if treatment is needed.  Her labs are only slightly out of the normal range currently but I want her to see an endocrinologist for further workup.  Please add hyperthyroidism to her diagnosis list and place a referral to endocrinology.  Please ask Julianne to help set up an appointment in the next month if possible.  The rest of her labs were all essentially normal or not concerning.

## 2024-08-26 ENCOUNTER — TELEPHONE (OUTPATIENT)
Dept: PRIMARY CARE | Facility: CLINIC | Age: 44
End: 2024-08-26
Payer: COMMERCIAL

## 2024-08-26 DIAGNOSIS — E05.90 HYPERTHYROIDISM: Primary | ICD-10-CM

## 2024-08-26 NOTE — TELEPHONE ENCOUNTER
----- Message from Daljit Berrios sent at 8/22/2024  7:33 AM EDT -----  Please inform the patient that her recent labs show that she may have a thyroid that is overproducing thyroid hormones.  This is called hyperthyroidism.  We need to get her to see an endocrinologist for further evaluation to confirm this and see if treatment is needed.  Her labs are only slightly out of the normal range currently but I want her to see an endocrinologist for further workup.  Please add hyperthyroidism to her diagnosis list and place a referral to endocrinology.  Please ask Julianne to help set up an appointment in the next month if possible.  The rest of her labs were all essentially normal or not concerning.

## 2024-09-16 ENCOUNTER — APPOINTMENT (OUTPATIENT)
Dept: OBSTETRICS AND GYNECOLOGY | Facility: CLINIC | Age: 44
End: 2024-09-16
Payer: COMMERCIAL

## 2024-09-26 ENCOUNTER — APPOINTMENT (OUTPATIENT)
Dept: OBSTETRICS AND GYNECOLOGY | Facility: CLINIC | Age: 44
End: 2024-09-26
Payer: COMMERCIAL

## 2024-09-26 DIAGNOSIS — R10.84 GENERALIZED ABDOMINAL PAIN: ICD-10-CM

## 2024-09-26 DIAGNOSIS — R21 SKIN RASH: Primary | ICD-10-CM

## 2024-09-26 DIAGNOSIS — Z78.9 USES BIRTH CONTROL: ICD-10-CM

## 2024-09-26 LAB
B-HCG SERPL-ACNC: <2 MIU/ML
ESTRADIOL SERPL-MCNC: 44 PG/ML
FSH SERPL-ACNC: 6.2 IU/L
HBV SURFACE AG SERPL QL IA: NONREACTIVE
HCV AB SER QL: NONREACTIVE
HIV 1+2 AB+HIV1 P24 AG SERPL QL IA: NONREACTIVE
LH SERPL-ACNC: 3.8 IU/L
PREGNANCY TEST URINE, POC: NEGATIVE
TREPONEMA PALLIDUM IGG+IGM AB [PRESENCE] IN SERUM OR PLASMA BY IMMUNOASSAY: NONREACTIVE

## 2024-09-26 PROCEDURE — 1036F TOBACCO NON-USER: CPT | Performed by: ADVANCED PRACTICE MIDWIFE

## 2024-09-26 PROCEDURE — 86780 TREPONEMA PALLIDUM: CPT

## 2024-09-26 PROCEDURE — 87591 N.GONORRHOEAE DNA AMP PROB: CPT

## 2024-09-26 PROCEDURE — 87340 HEPATITIS B SURFACE AG IA: CPT

## 2024-09-26 PROCEDURE — 82670 ASSAY OF TOTAL ESTRADIOL: CPT

## 2024-09-26 PROCEDURE — 87491 CHLMYD TRACH DNA AMP PROBE: CPT

## 2024-09-26 PROCEDURE — 99214 OFFICE O/P EST MOD 30 MIN: CPT | Performed by: ADVANCED PRACTICE MIDWIFE

## 2024-09-26 PROCEDURE — 87661 TRICHOMONAS VAGINALIS AMPLIF: CPT

## 2024-09-26 PROCEDURE — 81025 URINE PREGNANCY TEST: CPT | Performed by: ADVANCED PRACTICE MIDWIFE

## 2024-09-26 PROCEDURE — 83002 ASSAY OF GONADOTROPIN (LH): CPT

## 2024-09-26 PROCEDURE — 86803 HEPATITIS C AB TEST: CPT

## 2024-09-26 PROCEDURE — 83001 ASSAY OF GONADOTROPIN (FSH): CPT

## 2024-09-26 PROCEDURE — 87389 HIV-1 AG W/HIV-1&-2 AB AG IA: CPT

## 2024-09-26 PROCEDURE — 84702 CHORIONIC GONADOTROPIN TEST: CPT

## 2024-09-26 RX ORDER — NORETHINDRONE ACETATE AND ETHINYL ESTRADIOL 1MG-20(24)
1 KIT ORAL DAILY
Qty: 90 TABLET | Refills: 1 | Status: SHIPPED | OUTPATIENT
Start: 2024-09-26 | End: 2025-09-26

## 2024-09-26 NOTE — PROGRESS NOTES
GYN Problem note  Jazmin Bowman  is a 44 y.o. who presents with multiple complaints.   Chief Complaint   Patient presents with    Menstrual Problem       Reports a faintly positive UPT at home, abdominal bloating, and feeling as though she can feel fetal movement. She has presented with this in the past and has not been pregnant. Feels as though if she isn't pregnant then there is something really wrong.   Requesting that we check her hormones and do a full STI workup.   Would like to start birth control if she isn't pregnant, though she hopes she is pregnant.   Also reports a red, itchy rash on her lower abdomen suprapubic region.      Physical Exam   Physical Exam  Constitutional:       Appearance: Normal appearance.   Genitourinary:      Vulva normal.   HENT:      Head: Normocephalic and atraumatic.   Pulmonary:      Effort: Pulmonary effort is normal.   Abdominal:      Palpations: Abdomen is soft.      Tenderness: There is no abdominal tenderness.   Musculoskeletal:         General: Normal range of motion.   Neurological:      General: No focal deficit present.      Mental Status: She is alert and oriented to person, place, and time.   Skin:     Findings: Rash present.   Psychiatric:         Mood and Affect: Mood normal.         Behavior: Behavior normal.   Vitals reviewed.     BP: 127/86  WT: 157.6  LMP: 9/23/24     Assessment/Plan   Possible pregnancy  Negative UPT in the office, beta hCG drawn  Pelvic U/S  Potential exposure to STIs  GC/CT, trich collected  Accepts  serum collection for HIV, Hep B, Hep C, & Syphilis  Birth control counseling and initiation  Discussed risks of estrogen and blood clots.  Pt denies personal hx of blood clots, migraines with aura, smoking/vaping nicotine, 1st ° relative with thromboembolic event prior to the age of 49 yo, or personal hx of breast cancer.  Prescription for Loestrin FE 24 sent to pharmacy  Skin rash  Prescription for mupirocin/betameth/miconazole sent to  pharmacy    Return to care as needed    Fanta Das, APRN-CNM

## 2024-09-27 ENCOUNTER — TELEPHONE (OUTPATIENT)
Dept: OBSTETRICS AND GYNECOLOGY | Facility: CLINIC | Age: 44
End: 2024-09-27
Payer: COMMERCIAL

## 2024-09-27 NOTE — TELEPHONE ENCOUNTER
Left detailed message as her name was on her voicemail. Let her know to call back if she had any questions or concerns.

## 2024-10-07 ENCOUNTER — HOSPITAL ENCOUNTER (OUTPATIENT)
Dept: RADIOLOGY | Facility: CLINIC | Age: 44
Discharge: HOME | End: 2024-10-07
Payer: COMMERCIAL

## 2024-10-07 DIAGNOSIS — R10.84 GENERALIZED ABDOMINAL PAIN: ICD-10-CM

## 2024-10-07 PROCEDURE — 76856 US EXAM PELVIC COMPLETE: CPT | Performed by: RADIOLOGY

## 2024-10-07 PROCEDURE — 76830 TRANSVAGINAL US NON-OB: CPT

## 2024-10-07 PROCEDURE — 76830 TRANSVAGINAL US NON-OB: CPT | Performed by: RADIOLOGY

## 2024-10-10 ENCOUNTER — OFFICE VISIT (OUTPATIENT)
Dept: OBSTETRICS AND GYNECOLOGY | Facility: CLINIC | Age: 44
End: 2024-10-10
Payer: COMMERCIAL

## 2024-10-10 VITALS
SYSTOLIC BLOOD PRESSURE: 127 MMHG | HEIGHT: 67 IN | BODY MASS INDEX: 24.96 KG/M2 | DIASTOLIC BLOOD PRESSURE: 86 MMHG | WEIGHT: 159 LBS

## 2024-10-10 DIAGNOSIS — N83.201 RIGHT OVARIAN CYST: Primary | ICD-10-CM

## 2024-10-10 DIAGNOSIS — K59.00 CONSTIPATION, UNSPECIFIED CONSTIPATION TYPE: ICD-10-CM

## 2024-10-10 PROCEDURE — 3008F BODY MASS INDEX DOCD: CPT | Performed by: ADVANCED PRACTICE MIDWIFE

## 2024-10-10 PROCEDURE — 1036F TOBACCO NON-USER: CPT | Performed by: ADVANCED PRACTICE MIDWIFE

## 2024-10-10 PROCEDURE — 99213 OFFICE O/P EST LOW 20 MIN: CPT | Performed by: ADVANCED PRACTICE MIDWIFE

## 2024-10-10 RX ORDER — DOCUSATE SODIUM 100 MG/1
100 CAPSULE, LIQUID FILLED ORAL 3 TIMES DAILY PRN
Qty: 60 CAPSULE | Refills: 3 | Status: SHIPPED | OUTPATIENT
Start: 2024-10-10 | End: 2025-10-10

## 2024-10-10 NOTE — PROGRESS NOTES
GYN Problem note  Jazmin Bowman  is a 44 y.o. who presents to follow up and discuss recent test results.  Chief Complaint   Patient presents with    Follow-up     Test results     Was seen 9/26 with c/o bloating, + UPT, and feeling of fetal movement. At that time wanted her hormones checked and screened for STIs  Reviewed labs, all negative and WNLs. Pelvic U/S revealed a right ovarian cyst. Discussed the normalcy of ovarian cysts and that they usually resolve spontaneously. Will plan for a f/u U/S in 3 months.       Physical Exam   Physical Exam  Constitutional:       Appearance: Normal appearance.   HENT:      Head: Normocephalic and atraumatic.   Pulmonary:      Effort: Pulmonary effort is normal.   Musculoskeletal:         General: Normal range of motion.   Neurological:      General: No focal deficit present.      Mental Status: She is alert and oriented to person, place, and time.   Psychiatric:         Mood and Affect: Mood normal.         Behavior: Behavior normal.   Vitals reviewed.          Visit Vitals  /86            Assessment/Plan   Encounter to review results  Pt verbalizes understanding that her pelvic U/S is essentially normal, apart from the ovarian cyst and that she is NOT pregnant.  Pt verbalizes understanding that her labs were alll negative and normal.   Constipation  Fluids and fiber encouraged  Prescription for Colace sent to pharmacy    Return to care as needed      JOSUE Bernal

## 2024-11-22 DIAGNOSIS — G43.909 MIGRAINE SYNDROME: ICD-10-CM

## 2024-11-22 RX ORDER — ONDANSETRON 4 MG/1
4 TABLET, FILM COATED ORAL EVERY 8 HOURS PRN
Qty: 30 TABLET | Refills: 3 | Status: SHIPPED | OUTPATIENT
Start: 2024-11-22

## 2025-01-30 ENCOUNTER — OFFICE VISIT (OUTPATIENT)
Dept: PRIMARY CARE | Facility: CLINIC | Age: 45
End: 2025-01-30
Payer: COMMERCIAL

## 2025-01-30 VITALS
SYSTOLIC BLOOD PRESSURE: 124 MMHG | BODY MASS INDEX: 27.15 KG/M2 | TEMPERATURE: 97.1 F | DIASTOLIC BLOOD PRESSURE: 82 MMHG | RESPIRATION RATE: 18 BRPM | HEART RATE: 108 BPM | WEIGHT: 173 LBS | HEIGHT: 67 IN

## 2025-01-30 DIAGNOSIS — B35.1 ONYCHOMYCOSIS: ICD-10-CM

## 2025-01-30 DIAGNOSIS — Z11.3 SCREEN FOR STD (SEXUALLY TRANSMITTED DISEASE): ICD-10-CM

## 2025-01-30 DIAGNOSIS — G43.909 MIGRAINE SYNDROME: Primary | ICD-10-CM

## 2025-01-30 DIAGNOSIS — J06.9 VIRAL UPPER RESPIRATORY INFECTION: ICD-10-CM

## 2025-01-30 PROCEDURE — 99214 OFFICE O/P EST MOD 30 MIN: CPT | Performed by: FAMILY MEDICINE

## 2025-01-30 PROCEDURE — 1036F TOBACCO NON-USER: CPT | Performed by: FAMILY MEDICINE

## 2025-01-30 PROCEDURE — 3008F BODY MASS INDEX DOCD: CPT | Performed by: FAMILY MEDICINE

## 2025-01-30 RX ORDER — CICLOPIROX 80 MG/ML
1 SOLUTION TOPICAL NIGHTLY
Qty: 6.6 ML | Refills: 3 | Status: SHIPPED | OUTPATIENT
Start: 2025-01-30

## 2025-01-30 RX ORDER — DOXEPIN HYDROCHLORIDE 25 MG/1
25 CAPSULE ORAL NIGHTLY
COMMUNITY
Start: 2025-01-07 | End: 2025-01-30 | Stop reason: ALTCHOICE

## 2025-01-30 RX ORDER — ONDANSETRON HYDROCHLORIDE 8 MG/1
8 TABLET, FILM COATED ORAL EVERY 8 HOURS PRN
Qty: 30 TABLET | Refills: 1 | Status: SHIPPED | OUTPATIENT
Start: 2025-01-30

## 2025-01-30 NOTE — ASSESSMENT & PLAN NOTE
Insurance would not cover [facility] so I will send in a prescription for Ubrelvy.  It is listed as preferred step therapy in [facility].  I gave her detailed instructions on how to take this medicine as needed.  I will refill ondansetron for as needed use.  Orders:    ubrogepant (Ubrelvy) 100 mg tablet tablet; Take 1 tablet (100 mg) by mouth once daily as needed (with migraine headaches). Take one at onset of migraine.  May repeat in 2 hours x 1.  Do not exceed 2 tabs in 24 hours.    ondansetron (Zofran) 8 mg tablet; Take 1 tablet (8 mg) by mouth every 8 hours if needed for nausea or vomiting.

## 2025-01-30 NOTE — ASSESSMENT & PLAN NOTE
Left first toe.  We will treat with ciclopirox topical solution and I gave her detailed instructions on how to apply this.  Orders:    ciclopirox (Penlac) 8 % solution; Apply 1 Application topically once daily at bedtime.

## 2025-01-30 NOTE — PROGRESS NOTES
Jazmin Bowman is a 44 y.o. female here today for   Chief Complaint   Patient presents with    Migraine        HPI     Patient with cough x 2 days.  Vomited once 3 days ago.  Covid negative.  Nose congested.  No ear pain.  She has not had any fever.  Her cough is not severe or productive.    Migraine HA's - insurance would not cover [facility].  She is still having very frequent headaches and multiple triptans in the past have not helped.  She does take ondansetron as needed when she has nausea with migraines.    Toenail pain of left 1st toe.  Thickened and yellow.  She was previously on a topical medicine from a different doctor and says that it did clear but now has involvement of a different toe.    She asked if I can order blood labs for STI screening.  She had sex unprotected with her ex fiancé recently and she just wants to be safe.  She had Negative STI testing in August 2024.    Current Outpatient Medications:     Abilify Maintena 400 mg injection, inject 1 DOSE intramuscularly EVERY 4 WEEKS, Disp: , Rfl:     benztropine (Cogentin) 1 mg tablet, Take 1 tablet (1 mg) by mouth 2 times a day as needed., Disp: , Rfl:     clonazePAM (KlonoPIN) 0.5 mg tablet, Take by mouth., Disp: , Rfl:     docusate sodium (Colace) 100 mg capsule, Take 1 capsule (100 mg) by mouth 2 times a day as needed for constipation., Disp: 60 capsule, Rfl: 0    hydrOXYzine HCL (Atarax) 50 mg tablet, Take 1 tablet (50 mg) by mouth 3 times a day as needed., Disp: , Rfl:     Invega Sustenna 234 mg/1.5 mL syringe, inject 1 intramuscularly every 4 weeks, Disp: , Rfl:     ondansetron ODT (Zofran-ODT) 8 mg disintegrating tablet, Take 1 tablet by mouth every 8 hours if needed for nausea or vomiting., Disp: 30 tablet, Rfl: 1    polyethylene glycol (Glycolax, Miralax) 17 gram packet, Take 17 g by mouth once daily as needed (constipation). Mix 1 cap (17g) into 8 ounces of fluid., Disp: 30 packet, Rfl: 6    prazosin (Minipress) 1 mg capsule, Take 1 capsule (1  "mg) by mouth once daily at bedtime., Disp: , Rfl:     rimegepant (Nurtec ODT) 75 mg tablet,disintegrating, Take one tablet daily as needed for migraines., Disp: 15 tablet, Rfl: 6    traZODone (Desyrel) 50 mg tablet, Take 1 tablet (50 mg) by mouth as needed at bedtime., Disp: , Rfl:     ciclopirox (Penlac) 8 % solution, Apply 1 Application topically once daily at bedtime., Disp: 6.6 mL, Rfl: 3    ondansetron (Zofran) 8 mg tablet, Take 1 tablet (8 mg) by mouth every 8 hours if needed for nausea or vomiting., Disp: 30 tablet, Rfl: 1    ubrogepant (Ubrelvy) 100 mg tablet tablet, Take 1 tablet (100 mg) by mouth once daily as needed (with migraine headaches). Take one at onset of migraine.  May repeat in 2 hours x 1.  Do not exceed 2 tabs in 24 hours., Disp: 30 tablet, Rfl: 1    Patient Active Problem List   Diagnosis    ASCUS with positive high risk HPV cervical    Attention deficit hyperactivity disorder (ADHD), combined type    Constipation    Episode of recurrent major depressive disorder (CMS-HCC)    Hereditary disease in family possibly affecting fetus (HHS-HCC)    Panic disorder    Generalized anxiety disorder    Migraine    Maternal care for (suspected) damage to fetus by drugs    Insomnia    Paranoid schizophrenia (Multi)    Seizure (Multi)    Yeast vaginitis    Encounter for preventive health examination    Underweight    Extrapyramidal movement disorder, drug-induced    Elevated prolactin level    Abnormal TSH    Sleep disturbance    Missed period    Low back pain    History of optic neuritis    History of depression    Dizziness and giddiness    Cervical atypia    Abdominal swelling    Retained products of conception after miscarriage (HHS-HCC)    Migraine syndrome    Onychomycosis         Objective    Visit Vitals    Visit Vitals  /82   Pulse 108   Temp 36.2 °C (97.1 °F)   Resp 18   Ht 1.702 m (5' 7\")   Wt 78.5 kg (173 lb)   BMI 27.10 kg/m²   OB Status Unknown   Smoking Status Former   BSA 1.93 m² "       Body mass index is 27.1 kg/m².     Physical Exam     General -  Cooperative, Not in acute distress.    Skin - Warm and dry with no rashes.    Eye - Bilateral - pupils equal and round, sclera clear and lids pink without edema or mass.  Sclera and conjunctiva - bilateral - Normal.    ENMT - Left -TM pearly grey with good light reflex and externa auditory canal pink and dry.              Right -TM pearly grey with good light relflex and external auditory canal pink and dry.    Oropharynx - moist and pink, tonsils normal, no erythema noted.    Nose  - Nasal mucosa - no purulent discharge.    Sinuses -- Frontal - no tenderness observed.  Maxillary - no tenderness observed.  Ethmoid - no tenderness observed.    Lungs - Clear to auscultation and normal breathing effort.  Even and easy respiratory effort with no use of accessory muscles.    Heart -- RRR and no murmurs, rubs or thrill    Lymphatic - Cervical nodes normal with no enlargement.    Toes-left first toe with a thickened and yellow nail.    Assessment & Plan  Migraine syndrome  Insurance would not cover [facility] so I will send in a prescription for Ubrelvy.  It is listed as preferred step therapy in [employer].  I gave her detailed instructions on how to take this medicine as needed.  I will refill ondansetron for as needed use.  Orders:    ubrogepant (Ubrelvy) 100 mg tablet tablet; Take 1 tablet (100 mg) by mouth once daily as needed (with migraine headaches). Take one at onset of migraine.  May repeat in 2 hours x 1.  Do not exceed 2 tabs in 24 hours.    ondansetron (Zofran) 8 mg tablet; Take 1 tablet (8 mg) by mouth every 8 hours if needed for nausea or vomiting.    Onychomycosis  Left first toe.  We will treat with ciclopirox topical solution and I gave her detailed instructions on how to apply this.  Orders:    ciclopirox (Penlac) 8 % solution; Apply 1 Application topically once daily at bedtime.    Screen for STD (sexually transmitted disease)  At her  request I will order blood labs for STI screening.  She denies any vaginal symptoms currently.  We discussed safe sex and condom use.  Orders:    Hepatitis B Core Antibody, IgM; Future    Hepatitis C Antibody; Future    HIV 1/2 Antigen/Antibody Screen with Reflex to Confirmation; Future    Syphilis Screen with Reflex; Future    Viral upper respiratory infection  There is no evidence of a bacterial infection on exam.  Call or RTO if symptoms worsen or don't fully resolve.  Increase fluid intake.  Rest.  May use OTC symptomatic medications as needed at the appropriate dose.            Orders Placed This Encounter      ciclopirox (Penlac) 8 % solution      ondansetron (Zofran) 8 mg tablet      ubrogepant (Ubrelvy) 100 mg tablet tablet       Orders Placed This Encounter   Procedures    Hepatitis B Core Antibody, IgM    Hepatitis C Antibody    HIV 1/2 Antigen/Antibody Screen with Reflex to Confirmation    Syphilis Screen with Reflex        New Medications Ordered This Visit   Medications    ubrogepant (Ubrelvy) 100 mg tablet tablet     Sig: Take 1 tablet (100 mg) by mouth once daily as needed (with migraine headaches). Take one at onset of migraine.  May repeat in 2 hours x 1.  Do not exceed 2 tabs in 24 hours.     Dispense:  30 tablet     Refill:  1    ondansetron (Zofran) 8 mg tablet     Sig: Take 1 tablet (8 mg) by mouth every 8 hours if needed for nausea or vomiting.     Dispense:  30 tablet     Refill:  1     This prescription was filled on 11/22/2024. Any refills authorized will be placed on file.    ciclopirox (Penlac) 8 % solution     Sig: Apply 1 Application topically once daily at bedtime.     Dispense:  6.6 mL     Refill:  3

## 2025-01-31 DIAGNOSIS — G43.909 MIGRAINE WITHOUT STATUS MIGRAINOSUS, NOT INTRACTABLE, UNSPECIFIED MIGRAINE TYPE: ICD-10-CM

## 2025-01-31 RX ORDER — RIZATRIPTAN BENZOATE 10 MG/1
10 TABLET ORAL ONCE AS NEEDED
Qty: 20 TABLET | Refills: 3 | Status: SHIPPED | OUTPATIENT
Start: 2025-01-31

## 2025-02-02 LAB
HBV CORE IGM SERPL QL IA: NORMAL
HCV AB SERPL QL IA: NORMAL
HIV 1+2 AB+HIV1 P24 AG SERPL QL IA: NORMAL
T PALLIDUM AB SER QL IA: NEGATIVE

## 2025-02-03 DIAGNOSIS — G43.909 MIGRAINE WITHOUT STATUS MIGRAINOSUS, NOT INTRACTABLE, UNSPECIFIED MIGRAINE TYPE: ICD-10-CM

## 2025-02-03 NOTE — TELEPHONE ENCOUNTER
PA for Ubrelvy was denied, the requested medication requires the member to have a history of at least 14 days of therapy with at least two preferred medications which include but are not limited to: Imitrex Nasal Spray, naratriptan tablets, rizatriptan (oral disintegrating tablets and tablets), and sumatriptan (injection, nasal spray, and tablets).

## 2025-02-06 ENCOUNTER — APPOINTMENT (OUTPATIENT)
Dept: PRIMARY CARE | Facility: CLINIC | Age: 45
End: 2025-02-06
Payer: COMMERCIAL

## 2025-02-06 VITALS
WEIGHT: 170 LBS | SYSTOLIC BLOOD PRESSURE: 138 MMHG | DIASTOLIC BLOOD PRESSURE: 82 MMHG | TEMPERATURE: 97 F | RESPIRATION RATE: 18 BRPM | BODY MASS INDEX: 26.68 KG/M2 | HEIGHT: 67 IN | HEART RATE: 120 BPM

## 2025-02-06 DIAGNOSIS — J40 SINOBRONCHITIS: Primary | ICD-10-CM

## 2025-02-06 DIAGNOSIS — F33.9 EPISODE OF RECURRENT MAJOR DEPRESSIVE DISORDER, UNSPECIFIED DEPRESSION EPISODE SEVERITY (CMS-HCC): ICD-10-CM

## 2025-02-06 DIAGNOSIS — J32.9 SINOBRONCHITIS: Primary | ICD-10-CM

## 2025-02-06 DIAGNOSIS — F41.1 GENERALIZED ANXIETY DISORDER: ICD-10-CM

## 2025-02-06 DIAGNOSIS — F20.0 PARANOID SCHIZOPHRENIA (MULTI): ICD-10-CM

## 2025-02-06 PROCEDURE — 1036F TOBACCO NON-USER: CPT | Performed by: FAMILY MEDICINE

## 2025-02-06 PROCEDURE — 99214 OFFICE O/P EST MOD 30 MIN: CPT | Performed by: FAMILY MEDICINE

## 2025-02-06 PROCEDURE — 3008F BODY MASS INDEX DOCD: CPT | Performed by: FAMILY MEDICINE

## 2025-02-06 RX ORDER — AZITHROMYCIN 250 MG/1
TABLET, FILM COATED ORAL
Qty: 6 TABLET | Refills: 0 | Status: SHIPPED | OUTPATIENT
Start: 2025-02-06 | End: 2025-02-11

## 2025-02-06 RX ORDER — FLUCONAZOLE 150 MG/1
150 TABLET ORAL DAILY
Qty: 3 TABLET | Refills: 0 | Status: SHIPPED | OUTPATIENT
Start: 2025-02-06 | End: 2025-02-09

## 2025-02-06 ASSESSMENT — ENCOUNTER SYMPTOMS: COUGH: 1

## 2025-02-06 NOTE — ASSESSMENT & PLAN NOTE
At her request I will refer her to a new psychiatrist since hers is leaving the area.  Orders:    Referral to Psychiatry; Future

## 2025-02-06 NOTE — ASSESSMENT & PLAN NOTE
We will treat with azithromycin Z-Damon.  She asked for prescription for Diflucan if needed for vaginal yeast infections.  Call or RTO if symptoms worsen or don't fully resolve.  Increase fluid intake.  Rest.  May use OTC symptomatic medications as needed at the appropriate dose.  Orders:    azithromycin (Zithromax) 250 mg tablet; Take 2 tablets (500 mg) by mouth once daily for 1 day, THEN 1 tablet (250 mg) once daily for 4 days.    fluconazole (Diflucan) 150 mg tablet; Take 1 tablet (150 mg) by mouth once daily for 3 days.

## 2025-02-06 NOTE — PROGRESS NOTES
Jazmin Bowman is a 44 y.o. female here today for   Chief Complaint   Patient presents with    Cough        Cough  This is a new problem. The current episode started 1 to 4 weeks ago.      Still with loose and productive cough.  Some occasional wheezing.  Phlegm is yellow.  No F/C.  No ear pain.  She is having some pain and pressure over her maxillary sinuses with some yellow nasal discharge.  No body aches.  Sxs since about 2 weeks ago.  Home Covid negative.      Her psychiatrist is leaving his practice.  She asked for a referral to a different psychiatrist.    Current Outpatient Medications:     Abilify Maintena 400 mg injection, inject 1 DOSE intramuscularly EVERY 4 WEEKS, Disp: , Rfl:     benztropine (Cogentin) 1 mg tablet, Take 1 tablet (1 mg) by mouth 2 times a day as needed., Disp: , Rfl:     ciclopirox (Penlac) 8 % solution, Apply 1 Application topically once daily at bedtime., Disp: 6.6 mL, Rfl: 3    clonazePAM (KlonoPIN) 0.5 mg tablet, Take by mouth., Disp: , Rfl:     docusate sodium (Colace) 100 mg capsule, Take 1 capsule (100 mg) by mouth 2 times a day as needed for constipation., Disp: 60 capsule, Rfl: 0    hydrOXYzine HCL (Atarax) 50 mg tablet, Take 1 tablet (50 mg) by mouth 3 times a day as needed., Disp: , Rfl:     Invega Sustenna 234 mg/1.5 mL syringe, inject 1 intramuscularly every 4 weeks, Disp: , Rfl:     ondansetron (Zofran) 8 mg tablet, Take 1 tablet (8 mg) by mouth every 8 hours if needed for nausea or vomiting., Disp: 30 tablet, Rfl: 1    ondansetron ODT (Zofran-ODT) 8 mg disintegrating tablet, Take 1 tablet by mouth every 8 hours if needed for nausea or vomiting., Disp: 30 tablet, Rfl: 1    polyethylene glycol (Glycolax, Miralax) 17 gram packet, Take 17 g by mouth once daily as needed (constipation). Mix 1 cap (17g) into 8 ounces of fluid., Disp: 30 packet, Rfl: 6    prazosin (Minipress) 1 mg capsule, Take 1 capsule (1 mg) by mouth once daily at bedtime., Disp: , Rfl:     rimegepant (Nurtec  ODT) 75 mg tablet,disintegrating, Take one tablet daily as needed for migraines., Disp: 15 tablet, Rfl: 6    rizatriptan (Maxalt) 10 mg tablet, Take 1 tablet (10 mg) by mouth 1 time if needed for migraine. May repeat in 2 hours if unresolved. Do not exceed 30 mg in 24 hours., Disp: 20 tablet, Rfl: 3    traZODone (Desyrel) 50 mg tablet, Take 1 tablet (50 mg) by mouth as needed at bedtime., Disp: , Rfl:     azithromycin (Zithromax) 250 mg tablet, Take 2 tablets (500 mg) by mouth once daily for 1 day, THEN 1 tablet (250 mg) once daily for 4 days., Disp: 6 tablet, Rfl: 0    fluconazole (Diflucan) 150 mg tablet, Take 1 tablet (150 mg) by mouth once daily for 3 days., Disp: 3 tablet, Rfl: 0    ubrogepant (Ubrelvy) 100 mg tablet tablet, Take 1 tablet (100 mg) by mouth once daily as needed (with migraine headaches). Take one at onset of migraine.  May repeat in 2 hours x 1.  Do not exceed 2 tabs in 24 hours. (Patient not taking: Reported on 2/6/2025), Disp: 30 tablet, Rfl: 1    Patient Active Problem List   Diagnosis    ASCUS with positive high risk HPV cervical    Attention deficit hyperactivity disorder (ADHD), combined type    Constipation    Episode of recurrent major depressive disorder (CMS-HCC)    Hereditary disease in family possibly affecting fetus (HHS-HCC)    Panic disorder    Generalized anxiety disorder    Migraine    Maternal care for (suspected) damage to fetus by drugs    Insomnia    Paranoid schizophrenia (Multi)    Seizure (Multi)    Yeast vaginitis    Encounter for preventive health examination    Underweight    Extrapyramidal movement disorder, drug-induced    Elevated prolactin level    Abnormal TSH    Sleep disturbance    Missed period    Low back pain    History of optic neuritis    History of depression    Dizziness and giddiness    Cervical atypia    Abdominal swelling    Retained products of conception after miscarriage (HHS-HCC)    Migraine syndrome    Onychomycosis    Sinobronchitis      "    Objective    Visit Vitals    Visit Vitals  /82   Pulse (!) 120   Temp 36.1 °C (97 °F)   Resp 18   Ht 1.702 m (5' 7\")   Wt 77.1 kg (170 lb)   BMI 26.63 kg/m²   OB Status Unknown   Smoking Status Former   BSA 1.91 m²       Body mass index is 26.63 kg/m².     Physical Exam     General -  Cooperative, Not in acute distress.    Skin - Warm and dry with no rashes.    Eye - Bilateral - pupils equal and round, sclera clear and lids pink without edema or mass.  Sclera and conjunctiva - bilateral - Normal.    ENMT - Left -TM pearly grey with good light reflex and externa auditory canal pink and dry.              Right -TM pearly grey with good light relflex and external auditory canal pink and dry.    Oropharynx - moist and pink, tonsils normal, no erythema noted.    Nose  - Nasal mucosa - no purulent discharge.    Sinuses -- Frontal - no tenderness observed.  Maxillary -bilateral tenderness observed.  Ethmoid - no tenderness observed.    Lungs - There is some expiratory wheezes but no rales or rhonchi.  Even and easy respiratory effort with no use of accessory muscles.    Heart -- RRR and no murmurs, rubs or thrill    Lymphatic - Cervical nodes normal with no enlargement.    Assessment & Plan  Sinobronchitis  We will treat with azithromycin Z-Damon.  She asked for prescription for Diflucan if needed for vaginal yeast infections.  Call or RTO if symptoms worsen or don't fully resolve.  Increase fluid intake.  Rest.  May use OTC symptomatic medications as needed at the appropriate dose.  Orders:    azithromycin (Zithromax) 250 mg tablet; Take 2 tablets (500 mg) by mouth once daily for 1 day, THEN 1 tablet (250 mg) once daily for 4 days.    fluconazole (Diflucan) 150 mg tablet; Take 1 tablet (150 mg) by mouth once daily for 3 days.    Paranoid schizophrenia (Multi)  At her request I will refer her to a new psychiatrist since hers is leaving the area.  Orders:    Referral to Psychiatry; Future    Episode of recurrent " major depressive disorder, unspecified depression episode severity (CMS-HCC)    Orders:    Referral to Psychiatry; Future    Generalized anxiety disorder    Orders:    Referral to Psychiatry; Future         Orders Placed This Encounter      azithromycin (Zithromax) 250 mg tablet      fluconazole (Diflucan) 150 mg tablet       Orders Placed This Encounter   Procedures    Referral to Psychiatry        New Medications Ordered This Visit   Medications    azithromycin (Zithromax) 250 mg tablet     Sig: Take 2 tablets (500 mg) by mouth once daily for 1 day, THEN 1 tablet (250 mg) once daily for 4 days.     Dispense:  6 tablet     Refill:  0    fluconazole (Diflucan) 150 mg tablet     Sig: Take 1 tablet (150 mg) by mouth once daily for 3 days.     Dispense:  3 tablet     Refill:  0

## 2025-02-07 RX ORDER — NARATRIPTAN 2.5 MG/1
2.5 TABLET ORAL ONCE AS NEEDED
Qty: 30 TABLET | Refills: 2 | Status: SHIPPED | OUTPATIENT
Start: 2025-02-07

## 2025-02-18 ENCOUNTER — APPOINTMENT (OUTPATIENT)
Dept: PRIMARY CARE | Facility: CLINIC | Age: 45
End: 2025-02-18
Payer: COMMERCIAL

## 2025-02-18 VITALS
RESPIRATION RATE: 18 BRPM | SYSTOLIC BLOOD PRESSURE: 130 MMHG | BODY MASS INDEX: 26.06 KG/M2 | DIASTOLIC BLOOD PRESSURE: 84 MMHG | HEIGHT: 67 IN | WEIGHT: 166 LBS | TEMPERATURE: 97 F | HEART RATE: 120 BPM

## 2025-02-18 DIAGNOSIS — F41.1 GENERALIZED ANXIETY DISORDER: Primary | ICD-10-CM

## 2025-02-18 PROCEDURE — 3008F BODY MASS INDEX DOCD: CPT | Performed by: FAMILY MEDICINE

## 2025-02-18 PROCEDURE — 99213 OFFICE O/P EST LOW 20 MIN: CPT | Performed by: FAMILY MEDICINE

## 2025-02-18 RX ORDER — HYDROXYZINE HYDROCHLORIDE 50 MG/1
50 TABLET, FILM COATED ORAL 2 TIMES DAILY
Qty: 60 TABLET | Refills: 1 | Status: SHIPPED | OUTPATIENT
Start: 2025-02-18

## 2025-02-18 NOTE — PROGRESS NOTES
Jazmin Bowman is a 44 y.o. female here today for   Chief Complaint   Patient presents with    Follow-up        HPI     She takes Klonopin BID regularly - she has been taking it for years.  Her psychiatrist left the practice.  She has been taking it once a day now so she does not run out but she has not been able to get an appointment with a new psychiatrist yet.  She has tried a number of psychiatrist but has not been able to get an appointment.  She asks if I can refill the Klonopin.  She says that without it she is having increasing anxiety.  She has also taken hydroxyzine in the past for anxiety.    Current Outpatient Medications:     Abilify Maintena 400 mg injection, inject 1 DOSE intramuscularly EVERY 4 WEEKS, Disp: , Rfl:     benztropine (Cogentin) 1 mg tablet, Take 1 tablet (1 mg) by mouth 2 times a day as needed., Disp: , Rfl:     ciclopirox (Penlac) 8 % solution, Apply 1 Application topically once daily at bedtime., Disp: 6.6 mL, Rfl: 3    clonazePAM (KlonoPIN) 0.5 mg tablet, Take by mouth., Disp: , Rfl:     docusate sodium (Colace) 100 mg capsule, Take 1 capsule (100 mg) by mouth 2 times a day as needed for constipation., Disp: 60 capsule, Rfl: 0    hydrOXYzine HCL (Atarax) 50 mg tablet, Take 1 tablet (50 mg) by mouth 3 times a day as needed., Disp: , Rfl:     Invega Sustenna 234 mg/1.5 mL syringe, inject 1 intramuscularly every 4 weeks, Disp: , Rfl:     naratriptan (Amerge) 2.5 mg tablet, Take 1 tablet (2.5 mg) by mouth 1 time if needed for migraine. Take 1 tablet at the onset of headache and can repeat the dose 1 time after 4 hours if needed.  Do not take more than 2 tablets in 24 hours., Disp: 30 tablet, Rfl: 2    ondansetron (Zofran) 8 mg tablet, Take 1 tablet (8 mg) by mouth every 8 hours if needed for nausea or vomiting., Disp: 30 tablet, Rfl: 1    ondansetron ODT (Zofran-ODT) 8 mg disintegrating tablet, Take 1 tablet by mouth every 8 hours if needed for nausea or vomiting., Disp: 30 tablet, Rfl: 1     polyethylene glycol (Glycolax, Miralax) 17 gram packet, Take 17 g by mouth once daily as needed (constipation). Mix 1 cap (17g) into 8 ounces of fluid., Disp: 30 packet, Rfl: 6    prazosin (Minipress) 1 mg capsule, Take 1 capsule (1 mg) by mouth once daily at bedtime., Disp: , Rfl:     rimegepant (Nurtec ODT) 75 mg tablet,disintegrating, Take one tablet daily as needed for migraines., Disp: 15 tablet, Rfl: 6    rizatriptan (Maxalt) 10 mg tablet, Take 1 tablet (10 mg) by mouth 1 time if needed for migraine. May repeat in 2 hours if unresolved. Do not exceed 30 mg in 24 hours., Disp: 20 tablet, Rfl: 3    traZODone (Desyrel) 50 mg tablet, Take 1 tablet (50 mg) by mouth as needed at bedtime., Disp: , Rfl:     ubrogepant (Ubrelvy) 100 mg tablet tablet, Take 1 tablet (100 mg) by mouth once daily as needed (with migraine headaches). Take one at onset of migraine.  May repeat in 2 hours x 1.  Do not exceed 2 tabs in 24 hours. (Patient not taking: Reported on 2/6/2025), Disp: 30 tablet, Rfl: 1    Patient Active Problem List   Diagnosis    ASCUS with positive high risk HPV cervical    Attention deficit hyperactivity disorder (ADHD), combined type    Constipation    Episode of recurrent major depressive disorder (CMS-HCC)    Hereditary disease in family possibly affecting fetus (HHS-HCC)    Panic disorder    Generalized anxiety disorder    Migraine    Maternal care for (suspected) damage to fetus by drugs    Insomnia    Paranoid schizophrenia (Multi)    Seizure (Multi)    Yeast vaginitis    Encounter for preventive health examination    Underweight    Extrapyramidal movement disorder, drug-induced    Elevated prolactin level    Abnormal TSH    Sleep disturbance    Missed period    Low back pain    History of optic neuritis    History of depression    Dizziness and giddiness    Cervical atypia    Abdominal swelling    Retained products of conception after miscarriage (HHS-HCC)    Migraine syndrome    Onychomycosis     Sinobronchitis         Objective    Visit Vitals    Visit Vitals  OB Status Unknown   Smoking Status Former       There is no height or weight on file to calculate BMI.     Physical Exam     General - Not in acute distress and cooperative.  Build & Nutrition - Well developed  Posture - Normal  Gait - Normal  Mental Status - alert and oriented x 3    Head - Normocephalic    Eyes - Bilateral - Sclera clear and lids pink without edema or mass.      Skin - Warm and dry with no rashes on visible skin    Neuropsychiatric - normal mood and affect, well groomed and good eye contact.  Able to articulate well with normal speech/language, rate and coherence.  Associations are intact.  No evidence of hallucinations, delusions, obsessions or homicidal/suicidal ideation.  Attention span and ability to concentrate are normal.    Assessment & Plan  Generalized anxiety disorder  I have personally reviewed the [place] report for this patient. This report is scanned into the electronic medical record. I have considered the risks of abuse, dependence, addiction and diversion.      I told her that I cannot prescribe the clonazepam since it is a controlled substance and another provider has been prescribing it.  However I will prescribe hydroxyzine and she can use it twice daily for now until she sees her new psychiatrist and then they can take over and represcribe clonazepam if they feel it is needed.  The patient was comfortable with this plan.    Orders:    hydrOXYzine HCL (Atarax) 50 mg tablet; Take 1 tablet (50 mg) by mouth 2 times a day.               No orders of the defined types were placed in this encounter.       No orders of the defined types were placed in this encounter.

## 2025-02-18 NOTE — ASSESSMENT & PLAN NOTE
I have personally reviewed the [place] report for this patient. This report is scanned into the electronic medical record. I have considered the risks of abuse, dependence, addiction and diversion.      I told her that I cannot prescribe the clonazepam since it is a controlled substance and another provider has been prescribing it.  However I will prescribe hydroxyzine and she can use it twice daily for now until she sees her new psychiatrist and then they can take over and represcribe clonazepam if they feel it is needed.  The patient was comfortable with this plan.    Orders:    hydrOXYzine HCL (Atarax) 50 mg tablet; Take 1 tablet (50 mg) by mouth 2 times a day.

## 2025-04-07 ENCOUNTER — TELEPHONE (OUTPATIENT)
Dept: PRIMARY CARE | Facility: CLINIC | Age: 45
End: 2025-04-07
Payer: COMMERCIAL

## 2025-04-07 NOTE — TELEPHONE ENCOUNTER
Patient called she tested positive for covid today she states she has been testing herself for the past 5 days. She reports no symptoms besides a low grade fever yesterday and a runny nose. She wants to know if there is anything she should be doing or taking? Please review and call patient back.

## 2025-07-16 ENCOUNTER — APPOINTMENT (OUTPATIENT)
Dept: PRIMARY CARE | Facility: CLINIC | Age: 45
End: 2025-07-16
Payer: COMMERCIAL

## 2025-07-16 VITALS
TEMPERATURE: 98 F | HEIGHT: 67 IN | WEIGHT: 179 LBS | DIASTOLIC BLOOD PRESSURE: 82 MMHG | BODY MASS INDEX: 28.09 KG/M2 | SYSTOLIC BLOOD PRESSURE: 118 MMHG | RESPIRATION RATE: 18 BRPM | HEART RATE: 116 BPM

## 2025-07-16 DIAGNOSIS — L73.2 HIDRADENITIS: Primary | ICD-10-CM

## 2025-07-16 PROCEDURE — 99214 OFFICE O/P EST MOD 30 MIN: CPT | Performed by: FAMILY MEDICINE

## 2025-07-16 PROCEDURE — 3008F BODY MASS INDEX DOCD: CPT | Performed by: FAMILY MEDICINE

## 2025-07-16 RX ORDER — LOXAPINE SUCCINATE 50 MG/1
TABLET ORAL
COMMUNITY
Start: 2025-07-07

## 2025-07-16 RX ORDER — ZOLPIDEM TARTRATE 10 MG/1
TABLET ORAL NIGHTLY PRN
COMMUNITY

## 2025-07-16 RX ORDER — MUPIROCIN 20 MG/G
OINTMENT TOPICAL 3 TIMES DAILY
Qty: 22 G | Refills: 0 | Status: SHIPPED | OUTPATIENT
Start: 2025-07-16 | End: 2025-07-26

## 2025-07-16 RX ORDER — CEPHALEXIN 500 MG/1
500 CAPSULE ORAL 2 TIMES DAILY
Qty: 20 CAPSULE | Refills: 0 | Status: SHIPPED | OUTPATIENT
Start: 2025-07-16

## 2025-07-16 RX ORDER — FLUCONAZOLE 150 MG/1
150 TABLET ORAL DAILY
Qty: 3 TABLET | Refills: 0 | Status: SHIPPED | OUTPATIENT
Start: 2025-07-16

## 2025-07-16 NOTE — PROGRESS NOTES
Jazmin Bowman is a 44 y.o. female here today for   Chief Complaint   Patient presents with    Sore     Sore on R leg - possibly infected     Cough        HPI     Right inner thigh sore present for about 2 weeks.  She says that is an area of erythema and slight swelling.  It has been tender.  It is actually improved a little over the last 2 days.  She denies any fever.  She has no history of recurrent skin infections.    Last week she missed a few days of work because of pain in this area.  She asks if she can have a work note for those days.    Current Outpatient Medications   Medication Instructions    Abilify Maintena 400 mg injection inject 1 DOSE intramuscularly EVERY 4 WEEKS    benztropine (COGENTIN) 1 mg, 2 times daily PRN    cephalexin (KEFLEX) 500 mg, oral, 2 times daily    ciclopirox (Penlac) 8 % solution 1 Application, Topical, Nightly    clonazePAM (KlonoPIN) 0.5 mg tablet Take by mouth.    docusate sodium (COLACE) 100 mg, oral, 2 times daily PRN    fluconazole (DIFLUCAN) 150 mg, oral, Daily    hydrOXYzine HCL (ATARAX) 50 mg, oral, 2 times daily    Invega Sustenna 234 mg/1.5 mL syringe inject 1 intramuscularly every 4 weeks    loxapine (Loxitane) 50 mg capsule     mupirocin (Bactroban) 2 % ointment Topical, 3 times daily, apply to affected area    naratriptan (AMERGE) 2.5 mg, oral, Once as needed, Take 1 tablet at the onset of headache and can repeat the dose 1 time after 4 hours if needed.  Do not take more than 2 tablets in 24 hours.    ondansetron (ZOFRAN) 8 mg, oral, Every 8 hours PRN    ondansetron ODT (Zofran-ODT) 8 mg disintegrating tablet Take 1 tablet by mouth every 8 hours if needed for nausea or vomiting.    polyethylene glycol (GLYCOLAX, MIRALAX) 17 g, oral, Daily PRN, Mix 1 cap (17g) into 8 ounces of fluid.    prazosin (MINIPRESS) 1 mg, Nightly    rimegepant (Nurtec ODT) 75 mg tablet,disintegrating Take one tablet daily as needed for migraines.    rizatriptan (MAXALT) 10 mg, oral, Once as  "needed, May repeat in 2 hours if unresolved. Do not exceed 30 mg in 24 hours.    traZODone (DESYREL) 50 mg, Nightly PRN    ubrogepant (UBRELVY) 100 mg, oral, Daily PRN, Take one at onset of migraine.  May repeat in 2 hours x 1.  Do not exceed 2 tabs in 24 hours.    zolpidem (Ambien) 10 mg tablet Nightly PRN       Patient Active Problem List    Diagnosis Date Noted    Episode of recurrent major depressive disorder 04/03/2023    Generalized anxiety disorder 04/03/2023    Paranoid schizophrenia (Multi) 04/03/2023    Hidradenitis 07/16/2025    Sinobronchitis 02/06/2025    Migraine syndrome 01/30/2025    Onychomycosis 01/30/2025    History of optic neuritis 02/20/2024    History of depression 02/20/2024    Abdominal swelling 02/14/2024    Retained products of conception after miscarriage ([facility]) 02/14/2024    Missed period 12/07/2023    Abnormal TSH 11/29/2023    Sleep disturbance 05/04/2023    Encounter for preventive health examination 05/01/2023    ASCUS with positive high risk HPV cervical 04/03/2023    Attention deficit hyperactivity disorder (ADHD), combined type 04/03/2023    Constipation 04/03/2023    Hereditary disease in family possibly affecting fetus ([facility]) 04/03/2023    Panic disorder 04/03/2023    Migraine 04/03/2023    Maternal care for (suspected) damage to fetus by drugs 04/03/2023    Insomnia 04/03/2023    Seizure (Multi) 04/03/2023    Yeast vaginitis 04/03/2023    Cervical atypia 04/03/2023    Extrapyramidal movement disorder, drug-induced 03/14/2023    Underweight 03/10/2023    Elevated prolactin level 03/10/2023    Low back pain 11/21/2018    Dizziness and giddiness 11/21/2018         Objective      Visit Vitals    Visit Vitals  /82   Pulse (!) 116   Temp 36.7 °C (98 °F)   Resp 18   Ht 1.702 m (5' 7\")   Wt 81.2 kg (179 lb)   BMI 28.04 kg/m²   OB Status Unknown   Smoking Status Former   BSA 1.96 m²       Body mass index is 28.04 kg/m².     Physical Exam     Right leg-on the patient's inner " thigh about 6 inches distal to the groin crease is a small skin infection with some mild induration and erythema.  There is no pustule or open area.    Assessment & Plan  Hidradenitis  This is likely hidradenitis or an infected hair follicle.  We will treat with Keflex and topical mupirocin.  She asked for a prescription for Diflucan in case she gets a yeast infection from the antibiotics.  I will give her a note for the days she missed last week.  I recommend to call us and make a follow up appointment if sxs worsen or do not resolve.        Orders:    cephalexin (Keflex) 500 mg capsule; Take 1 capsule (500 mg) by mouth 2 times a day.    fluconazole (Diflucan) 150 mg tablet; Take 1 tablet (150 mg) by mouth once daily.    mupirocin (Bactroban) 2 % ointment; Apply topically 3 times a day for 10 days. apply to affected area         Orders Placed This Encounter      cephalexin (Keflex) 500 mg capsule      fluconazole (Diflucan) 150 mg tablet      mupirocin (Bactroban) 2 % ointment       No orders of the defined types were placed in this encounter.       New Medications Ordered This Visit   Medications    loxapine (Loxitane) 50 mg capsule    zolpidem (Ambien) 10 mg tablet     Sig: Take by mouth as needed at bedtime for sleep.    cephalexin (Keflex) 500 mg capsule     Sig: Take 1 capsule (500 mg) by mouth 2 times a day.     Dispense:  20 capsule     Refill:  0    fluconazole (Diflucan) 150 mg tablet     Sig: Take 1 tablet (150 mg) by mouth once daily.     Dispense:  3 tablet     Refill:  0    mupirocin (Bactroban) 2 % ointment     Sig: Apply topically 3 times a day for 10 days. apply to affected area     Dispense:  22 g     Refill:  0

## 2025-07-16 NOTE — ASSESSMENT & PLAN NOTE
This is likely hidradenitis or an infected hair follicle.  We will treat with Keflex and topical mupirocin.  She asked for a prescription for Diflucan in case she gets a yeast infection from the antibiotics.  I will give her a note for the days she missed last week.  I recommend to call us and make a follow up appointment if sxs worsen or do not resolve.        Orders:    cephalexin (Keflex) 500 mg capsule; Take 1 capsule (500 mg) by mouth 2 times a day.    fluconazole (Diflucan) 150 mg tablet; Take 1 tablet (150 mg) by mouth once daily.    mupirocin (Bactroban) 2 % ointment; Apply topically 3 times a day for 10 days. apply to affected area

## 2025-07-23 ENCOUNTER — PATIENT OUTREACH (OUTPATIENT)
Dept: CARE COORDINATION | Facility: CLINIC | Age: 45
End: 2025-07-23
Payer: COMMERCIAL

## 2025-07-23 DIAGNOSIS — Z12.31 ENCOUNTER FOR SCREENING MAMMOGRAM FOR BREAST CANCER: ICD-10-CM

## 2025-07-30 DIAGNOSIS — G43.909 MIGRAINE SYNDROME: ICD-10-CM

## 2025-07-30 RX ORDER — ONDANSETRON 8 MG/1
8 TABLET, FILM COATED ORAL EVERY 8 HOURS PRN
Qty: 15 TABLET | Refills: 0 | Status: SHIPPED | OUTPATIENT
Start: 2025-07-30

## 2025-07-30 NOTE — TELEPHONE ENCOUNTER
Pt made follow up appointment for 8/18/2025. She is totally out of Zofran. Can pt have 30 day refill to hold her over until that appointment? Local pharm correct. Please advise.

## 2025-07-31 ENCOUNTER — APPOINTMENT (OUTPATIENT)
Dept: PRIMARY CARE | Facility: CLINIC | Age: 45
End: 2025-07-31
Payer: COMMERCIAL

## 2025-08-18 ENCOUNTER — APPOINTMENT (OUTPATIENT)
Dept: PRIMARY CARE | Facility: CLINIC | Age: 45
End: 2025-08-18
Payer: COMMERCIAL

## 2025-09-08 ENCOUNTER — APPOINTMENT (OUTPATIENT)
Dept: ENDOCRINOLOGY | Facility: CLINIC | Age: 45
End: 2025-09-08
Payer: COMMERCIAL